# Patient Record
Sex: FEMALE | Race: BLACK OR AFRICAN AMERICAN | NOT HISPANIC OR LATINO | Employment: FULL TIME | URBAN - METROPOLITAN AREA
[De-identification: names, ages, dates, MRNs, and addresses within clinical notes are randomized per-mention and may not be internally consistent; named-entity substitution may affect disease eponyms.]

---

## 2018-02-01 ENCOUNTER — OFFICE VISIT (OUTPATIENT)
Dept: OTOLARYNGOLOGY | Facility: CLINIC | Age: 53
End: 2018-02-01
Payer: COMMERCIAL

## 2018-02-01 ENCOUNTER — CLINICAL SUPPORT (OUTPATIENT)
Dept: AUDIOLOGY | Facility: CLINIC | Age: 53
End: 2018-02-01
Payer: COMMERCIAL

## 2018-02-01 VITALS
HEART RATE: 70 BPM | HEIGHT: 60 IN | SYSTOLIC BLOOD PRESSURE: 134 MMHG | DIASTOLIC BLOOD PRESSURE: 89 MMHG | BODY MASS INDEX: 40.98 KG/M2 | WEIGHT: 208.75 LBS

## 2018-02-01 DIAGNOSIS — H61.21 RIGHT EAR IMPACTED CERUMEN: Primary | ICD-10-CM

## 2018-02-01 DIAGNOSIS — H90.3 BILATERAL HIGH FREQUENCY SENSORINEURAL HEARING LOSS: ICD-10-CM

## 2018-02-01 DIAGNOSIS — H90.3 BILATERAL HIGH FREQUENCY SENSORINEURAL HEARING LOSS: Primary | ICD-10-CM

## 2018-02-01 DIAGNOSIS — H91.90 HEARING DIFFICULTY, UNSPECIFIED LATERALITY: Primary | ICD-10-CM

## 2018-02-01 PROCEDURE — 99999 PR PBB SHADOW E&M-EST. PATIENT-LVL I: CPT | Mod: PBBFAC,,,

## 2018-02-01 PROCEDURE — 92567 TYMPANOMETRY: CPT | Mod: S$GLB,,, | Performed by: AUDIOLOGIST-HEARING AID FITTER

## 2018-02-01 PROCEDURE — 92557 COMPREHENSIVE HEARING TEST: CPT | Mod: S$GLB,,, | Performed by: AUDIOLOGIST-HEARING AID FITTER

## 2018-02-01 PROCEDURE — 3008F BODY MASS INDEX DOCD: CPT | Mod: S$GLB,,, | Performed by: NURSE PRACTITIONER

## 2018-02-01 PROCEDURE — 69210 REMOVE IMPACTED EAR WAX UNI: CPT | Mod: RT,S$GLB,, | Performed by: NURSE PRACTITIONER

## 2018-02-01 PROCEDURE — 99203 OFFICE O/P NEW LOW 30 MIN: CPT | Mod: 25,S$GLB,, | Performed by: NURSE PRACTITIONER

## 2018-02-01 PROCEDURE — 99999 PR PBB SHADOW E&M-EST. PATIENT-LVL III: CPT | Mod: PBBFAC,,, | Performed by: NURSE PRACTITIONER

## 2018-02-01 NOTE — LETTER
February 1, 2018      Lazaro Jensen MD  [address]  [place] [address]           [place] [facility]  [address] [facility] [address] [place] [address]  Phone: [phone]  Fax: [phone]          Patient: Norma Call   MR Number: [id number]   YOB: 1965   Date of Visit: 2/1/2018       Dear Dr. Lazaro Jensen:    Thank you for referring Norma Call to me for evaluation. Attached you will find relevant portions of my assessment and plan of care.    If you have questions, please do not hesitate to call me. I look forward to following Norma Call along with you.    Sincerely,    Chanell Bahena, NP    Enclosure  CC:  No Recipients    If you would like to receive this communication electronically, please contact [email or web address] or [phone] to request more information on [facility] access.    For providers and/or their staff who would like to refer a patient to [facility], please contact us through our one-stop-shop provider referral line, [facility] , at [phone].    If you feel you have received this communication in error or would no longer like to receive these types of communications, please e-mail [email or web address]

## 2018-02-01 NOTE — PROGRESS NOTES
Subjective:       Patient ID: Norma Call is a 52 y.o. female.    Chief Complaint: Cerumen Impaction and Ear Fullness    HPI   Patient reports gradual onset difficulty hearing and ear fullness. No otalgia or otorrhea. No other ENT symptoms or concerns. She states she take Singulair daily but cannot do nasal sprays daily because they cause drowsiness and she works nights.     Review of Systems   Constitutional: Negative.    HENT: Negative.         Ear fullness, muffled hearing   Eyes: Negative.    Respiratory: Negative.    Cardiovascular: Negative.    Gastrointestinal: Negative.    Musculoskeletal: Negative.    Skin: Negative.    Neurological: Negative.    Hematological: Negative.    Psychiatric/Behavioral: Negative.        Objective:      Physical Exam   Constitutional: She is oriented to person, place, and time. Vital signs are normal. She appears well-developed and well-nourished. She is cooperative. She does not appear ill. No distress.   HENT:   Head: Normocephalic and atraumatic.   Right Ear: Hearing, tympanic membrane, external ear and ear canal normal. Tympanic membrane is not erythematous. No middle ear effusion.   Left Ear: Hearing, tympanic membrane, external ear and ear canal normal. Tympanic membrane is not erythematous.  No middle ear effusion.   Nose: Nose normal. No mucosal edema or rhinorrhea. Right sinus exhibits no maxillary sinus tenderness and no frontal sinus tenderness. Left sinus exhibits no maxillary sinus tenderness and no frontal sinus tenderness.   Mouth/Throat: Uvula is midline, oropharynx is clear and moist and mucous membranes are normal. Mucous membranes are not pale, not dry and not cyanotic. No oral lesions. No oropharyngeal exudate, posterior oropharyngeal edema or posterior oropharyngeal erythema.     SEPARATE PROCEDURE IN OFFICE:   Procedure: Removal of impacted cerumen, RIGHT   Pre Procedure Diagnosis: Cerumen Impaction   Post Procedure Diagnosis: Cerumen Impaction   Verbal  informed consent in regards to risk of trauma to ear canal, ear drum or hearing, discomfort during procedure and/or inability to remove cerumen impaction in one session or unforeseen events or complications.   No anesthesia.     Procedure in detail:   Ear canal visualized bilateral with appropriate size ear speculum utilizing Operating Head Binocular Otomicroscope   Utilizing the following: Ring curet, delicate alligator forceps, and/or suction cannula. The impacted cerumen of the ear canals was removed atraumatically. The TM and EAC were then inspected and found to be clear of wax. See description of TMs/EACs in PE above.   Complications: No   Condition: Improved/Good     Eyes: Conjunctivae, EOM and lids are normal. Pupils are equal, round, and reactive to light. Right eye exhibits no discharge. Left eye exhibits no discharge. No scleral icterus.   Neck: Trachea normal and normal range of motion. Neck supple. No tracheal deviation present. No thyroid mass and no thyromegaly present.   Cardiovascular: Normal rate.    Pulmonary/Chest: Effort normal. No stridor. No respiratory distress. She has no wheezes.   Musculoskeletal: Normal range of motion.   Lymphadenopathy:        Head (right side): No submental, no submandibular, no tonsillar, no preauricular and no posterior auricular adenopathy present.        Head (left side): No submental, no submandibular, no tonsillar, no preauricular and no posterior auricular adenopathy present.     She has no cervical adenopathy.        Right cervical: No superficial cervical and no posterior cervical adenopathy present.       Left cervical: No superficial cervical and no posterior cervical adenopathy present.   Neurological: She is alert and oriented to person, place, and time. She has normal strength. Coordination and gait normal.   Skin: Skin is warm, dry and intact. No lesion and no rash noted. She is not diaphoretic. No cyanosis. No pallor.   Psychiatric: She has a normal mood  and affect. Her speech is normal and behavior is normal. Judgment and thought content normal. Cognition and memory are normal.   Nursing note and vitals reviewed.      Assessment:     Right cerumen impaction removed    Mild high-frequency SNHL AU  Plan:     Return as needed for further ENT concerns

## 2018-02-01 NOTE — PROGRESS NOTES
Norma Call was seen 02/01/2018 for an audiological evaluation. Patient complains of clogged ears. Pt reports some Hx of noise exposure but denies family Hx of hearing loss and tinnitus. She doesn't notice any significant difficulties hearing conversation at this time.     Results reveal a bilateral normal sloping to mild rising back to normal sensorineural hearing loss.    Speech Reception Thresholds were  10 dBHL for the right ear and 5 dBHL for the left ear.    Word recognition scores were excellent for the right ear and excellent for the left ear.   Tympanograms were Type Ad for the right ear and Type Ad for the left ear.    Audiogram results were reviewed in detail with patient and all questions were answered. Results will be reviewed by ENT at the completion of this note. Recommend trial of binaural amplification pending medical clearance, annual hearing tests to monitor hearing and hearing protection in loud noise.

## 2019-08-15 ENCOUNTER — OCCUPATIONAL HEALTH (OUTPATIENT)
Dept: URGENT CARE | Facility: CLINIC | Age: 54
End: 2019-08-15

## 2019-08-15 PROCEDURE — 86580 PR  TB INTRADERMAL TEST: ICD-10-PCS | Mod: S$GLB,,, | Performed by: EMERGENCY MEDICINE

## 2019-08-15 PROCEDURE — 86580 TB INTRADERMAL TEST: CPT | Mod: S$GLB,,, | Performed by: EMERGENCY MEDICINE

## 2021-03-16 ENCOUNTER — IMMUNIZATION (OUTPATIENT)
Dept: FAMILY MEDICINE | Facility: CLINIC | Age: 56
End: 2021-03-16

## 2021-03-16 DIAGNOSIS — Z23 NEED FOR VACCINATION: Primary | ICD-10-CM

## 2021-03-16 PROCEDURE — 91300 COVID-19, MRNA, LNP-S, PF, 30 MCG/0.3 ML DOSE VACCINE: CPT | Mod: ,,, | Performed by: FAMILY MEDICINE

## 2021-03-16 PROCEDURE — 91300 COVID-19, MRNA, LNP-S, PF, 30 MCG/0.3 ML DOSE VACCINE: ICD-10-PCS | Mod: ,,, | Performed by: FAMILY MEDICINE

## 2021-03-16 PROCEDURE — 0001A COVID-19, MRNA, LNP-S, PF, 30 MCG/0.3 ML DOSE VACCINE: CPT | Mod: CV19,,, | Performed by: FAMILY MEDICINE

## 2021-03-16 PROCEDURE — 0001A COVID-19, MRNA, LNP-S, PF, 30 MCG/0.3 ML DOSE VACCINE: ICD-10-PCS | Mod: CV19,,, | Performed by: FAMILY MEDICINE

## 2021-04-06 ENCOUNTER — IMMUNIZATION (OUTPATIENT)
Dept: FAMILY MEDICINE | Facility: CLINIC | Age: 56
End: 2021-04-06

## 2021-04-06 DIAGNOSIS — Z23 NEED FOR VACCINATION: Primary | ICD-10-CM

## 2021-04-06 PROCEDURE — 91300 COVID-19, MRNA, LNP-S, PF, 30 MCG/0.3 ML DOSE VACCINE: CPT | Mod: ,,, | Performed by: FAMILY MEDICINE

## 2021-04-06 PROCEDURE — 0002A COVID-19, MRNA, LNP-S, PF, 30 MCG/0.3 ML DOSE VACCINE: CPT | Mod: CV19,,, | Performed by: FAMILY MEDICINE

## 2021-04-06 PROCEDURE — 91300 COVID-19, MRNA, LNP-S, PF, 30 MCG/0.3 ML DOSE VACCINE: ICD-10-PCS | Mod: ,,, | Performed by: FAMILY MEDICINE

## 2021-04-06 PROCEDURE — 0002A COVID-19, MRNA, LNP-S, PF, 30 MCG/0.3 ML DOSE VACCINE: ICD-10-PCS | Mod: CV19,,, | Performed by: FAMILY MEDICINE

## 2021-09-14 PROBLEM — I48.91 ATRIAL FIBRILLATION: Status: ACTIVE | Noted: 2021-09-14

## 2021-10-21 ENCOUNTER — IMMUNIZATION (OUTPATIENT)
Dept: FAMILY MEDICINE | Facility: CLINIC | Age: 56
End: 2021-10-21
Payer: OTHER GOVERNMENT

## 2021-10-21 DIAGNOSIS — Z23 NEED FOR VACCINATION: Primary | ICD-10-CM

## 2021-10-21 PROCEDURE — 0003A COVID-19, MRNA, LNP-S, PF, 30 MCG/0.3 ML DOSE VACCINE: CPT | Mod: PBBFAC,PO

## 2021-10-21 PROCEDURE — 91300 COVID-19, MRNA, LNP-S, PF, 30 MCG/0.3 ML DOSE VACCINE: CPT | Mod: PBBFAC,PO

## 2022-01-10 ENCOUNTER — OFFICE VISIT (OUTPATIENT)
Dept: SPINE | Facility: CLINIC | Age: 57
End: 2022-01-10
Payer: OTHER GOVERNMENT

## 2022-01-10 ENCOUNTER — HOSPITAL ENCOUNTER (OUTPATIENT)
Dept: RADIOLOGY | Facility: HOSPITAL | Age: 57
Discharge: HOME | End: 2022-01-10
Attending: PHYSICIAN ASSISTANT
Payer: OTHER GOVERNMENT

## 2022-01-10 VITALS
HEART RATE: 63 BPM | SYSTOLIC BLOOD PRESSURE: 122 MMHG | WEIGHT: 224 LBS | HEIGHT: 60 IN | BODY MASS INDEX: 43.98 KG/M2 | DIASTOLIC BLOOD PRESSURE: 85 MMHG

## 2022-01-10 DIAGNOSIS — M62.838 MUSCLE SPASM OF SHOULDER REGION: ICD-10-CM

## 2022-01-10 DIAGNOSIS — M79.2 CERVICAL SPINE ARTHRITIS WITH NERVE PAIN: ICD-10-CM

## 2022-01-10 DIAGNOSIS — M79.602 LEFT ARM PAIN: ICD-10-CM

## 2022-01-10 DIAGNOSIS — M47.812 CERVICAL SPINE ARTHRITIS WITH NERVE PAIN: ICD-10-CM

## 2022-01-10 PROCEDURE — 99999 PR PBB SHADOW E&M-EST. PATIENT-LVL V: CPT | Mod: PBBFAC,,, | Performed by: PHYSICIAN ASSISTANT

## 2022-01-10 PROCEDURE — 99243 PR OFFICE CONSULTATION,LEVEL III: ICD-10-PCS | Mod: S$PBB,,, | Performed by: PHYSICIAN ASSISTANT

## 2022-01-10 PROCEDURE — 99999 PR PBB SHADOW E&M-EST. PATIENT-LVL V: ICD-10-PCS | Mod: PBBFAC,,, | Performed by: PHYSICIAN ASSISTANT

## 2022-01-10 PROCEDURE — 99243 OFF/OP CNSLTJ NEW/EST LOW 30: CPT | Mod: S$PBB,,, | Performed by: PHYSICIAN ASSISTANT

## 2022-01-10 PROCEDURE — 72052 X-RAY EXAM NECK SPINE 6/>VWS: CPT | Mod: 26,,, | Performed by: RADIOLOGY

## 2022-01-10 PROCEDURE — 72052 X-RAY EXAM NECK SPINE 6/>VWS: CPT | Mod: TC,PO

## 2022-01-10 PROCEDURE — 72052 XR CERVICAL SPINE 5 VIEW WITH FLEX AND EXT: ICD-10-PCS | Mod: 26,,, | Performed by: RADIOLOGY

## 2022-01-10 PROCEDURE — 99215 OFFICE O/P EST HI 40 MIN: CPT | Mod: PBBFAC,PN | Performed by: PHYSICIAN ASSISTANT

## 2022-01-10 RX ORDER — METHYLPREDNISOLONE 4 MG/1
TABLET ORAL
Qty: 1 EACH | Refills: 0 | Status: SHIPPED | OUTPATIENT
Start: 2022-01-10 | End: 2022-01-31

## 2022-01-10 NOTE — PROGRESS NOTES
Back and Spine Consult    Patient ID: Norma Call is a 56 y.o. female.    Chief Complaint   Patient presents with    Shoulder Pain     She has had left shoulder pain that radiates down the left arm to the elbow for 5-6 months. Pain started and gradually got worse until she noticed pain lifting the left arm.       Review of Systems   Constitutional: Negative for activity change, appetite change, chills, fever and unexpected weight change.   HENT: Negative for tinnitus, trouble swallowing and voice change.    Respiratory: Negative for apnea, cough, chest tightness and shortness of breath.    Cardiovascular: Negative for chest pain and palpitations.   Gastrointestinal: Negative for constipation, diarrhea, nausea and vomiting.   Genitourinary: Negative for difficulty urinating, dysuria, frequency and urgency.   Musculoskeletal: Positive for arthralgias, myalgias and neck pain. Negative for back pain, gait problem and neck stiffness.   Skin: Negative for wound.   Neurological: Positive for numbness. Negative for dizziness, tremors, seizures, facial asymmetry, speech difficulty, weakness, light-headedness and headaches.   Psychiatric/Behavioral: Negative for confusion and decreased concentration.       Past Medical History:   Diagnosis Date    a Family H/O CAD ###    On ASA 81 Mg Daily; 7/2/21 Cardiac CT Ca+ Score = 0 (See Report); 3/20/13 LCST (Dr. CLARA Treviño) = Entirely Normal; 3/20/13 Echo (Dr. CLARA Treviño) = Normal With EF 60-65%, With Normal Biventricular Size And Function, And With No Diastolic Dysfuncion     b Chronic Hypokalemia     3/30/17 Increased Her 10 mEq KCl Daily To Bid; Is Likely Due To Her Losartan/HCTZ 100/12.5 Mg qAM    b Hypertension     12/6/19 Added Norvasc 5 Mg qHS; 3/30/17 Increased Losartan/HCTZ 100/12.5 To 100/25 Mg qAM; 12/6/19 RXd A Low Salt Diet    c Hypercholesteremia     6/15/20 RXd Crestor 10 Mg qHS And D/Cd Pravastatin (Was Ineffective); 1/19/20 Increased 10 Mg Pravastatin To 40 Mg qHS  "   d Type 2 Diabetes Mellitus     12/21/21 Increased Amaryl To 2 Mg qAM; 7/4/20 Added Amaryl 1 Mg qAM; On Metformin- Mg Daily    e Family H/O Thyroid Disorder     f Borderline Osteopenia     5/5/18 Continued Her OTC D3 50K IU Weekly, And RXd OTC CaCO3 1,200 Mg Daily    f Obesity     10/2/17 RXd Lifestyle Changes; 5/10/16 BMI = 39.26    g Chronic Mild Thrombocytosis     Am Monitoring    i H/O COVID-19 Infection     In 01/2021    i Obstructive Sleep Apnea ###    Dr. Caroline Woodruff On 8/1/13 Scheduled A CPAP Titration Study    j Family H/O Colon Cancer ###    Dr. William Ruiz    j H/O Hyperplastic Colon Polyp On 4/30/15 TC     Dr. William Ruiz: "Repeat TC In 5 YRs"    j Sigmoid Diverticulosis     Dr. William Ruiz    k H/O Abnormal PAP Smear     Dr. Phillip Jj; Repeats Have Been Normal    k Hemorrhagic Right Renal Cyst     Dr. Verna Alicea On 11/15/16 Does NOT Suspect Renal Cell CA; 03/2016 Renal U/S And 05/2016 MRI Were Benign    l H/O Left Ankle And Foot Fracture Repair SX 09/2015     Dr. Page In [place]; She Has Been Having Heel Pain Since He Removed The Screws    m Headaches     Takes Ibuprofen 800 Mg PRN And Tramadol 50 Mg qHS PRN    o Allergic Rhinosinusitis     q Acne Vulgaris     q Vitamin D Deficiency     On Vitamin D3 50K Weekly     Wellness Visit 6/17/2021      Social History     Socioeconomic History    Marital status:      Spouse name: Ulysses    Number of children: 3   Occupational History    Occupation: nurse   Tobacco Use    Smoking status: Never Smoker    Smokeless tobacco: Never Used   Substance and Sexual Activity    Alcohol use: No    Drug use: No    Sexual activity: Not Currently     Family History   Problem Relation Age of Onset    Diabetes Maternal Grandmother     Stroke Father     Colon cancer Brother     Diabetes Brother     Hypertension Brother     Cancer Brother     Heart failure Mother     Liver disease Daughter  "     Review of patient's allergies indicates:   Allergen Reactions    Percocet [oxycodone-acetaminophen] Nausea And Vomiting    Codeine Palpitations       Current Outpatient Medications:     amLODIPine (NORVASC) 2.5 MG tablet, Take 1 tablet (2.5 mg total) by mouth once daily., Disp: 90 tablet, Rfl: 0    amLODIPine (NORVASC) 5 MG tablet, Take 1 tablet (5 mg total) by mouth once daily., Disp: 90 tablet, Rfl: 0    aspirin (ECOTRIN) 81 MG EC tablet, Take 1 tablet (81 mg total) by mouth once daily., Disp: , Rfl: 0    blood-glucose meter (BLOOD GLUCOSE MONITORING) kit, Use as instructed, Disp: 1 each, Rfl: 0    ergocalciferol (ERGOCALCIFEROL) 50,000 unit Cap, Take 1 capsule (50,000 Units total) by mouth every 7 days., Disp: 12 capsule, Rfl: 1    fluticasone propionate (FLONASE) 50 mcg/actuation nasal spray, 1 spray (50 mcg total) by Each Nostril route once daily. (Patient taking differently: 1 spray by Each Nostril route as needed.), Disp: 18.2 mL, Rfl: 3    glimepiride (AMARYL) 2 MG tablet, Take 1 tablet (2 mg total) by mouth every morning., Disp: 90 tablet, Rfl: 3    ibuprofen (ADVIL,MOTRIN) 800 MG tablet, Take 800 mg by mouth as needed., Disp: , Rfl: 0    lancets Misc, 1 lancet by Misc.(Non-Drug; Combo Route) route once daily., Disp: 100 each, Rfl: 6    magnesium 250 mg Tab, Take by mouth once., Disp: , Rfl:     metFORMIN (GLUCOPHAGE) 1000 MG tablet, Take 1 tablet (1,000 mg total) by mouth 2 (two) times daily., Disp: 180 tablet, Rfl: 3    montelukast (SINGULAIR) 10 mg tablet, TAKE 1 TABLET(10 MG) BY MOUTH EVERY NIGHT AS NEEDED (Patient taking differently: Take 10 mg by mouth every evening.), Disp: 90 tablet, Rfl: 1    mupirocin (BACTROBAN) 2 % ointment, Apply topically once daily. Apply to affected area, Disp: , Rfl: 0    PATADAY 0.2 % Drop, 1 drop daily as needed. , Disp: , Rfl: 3    potassium chloride SA (K-DUR,KLOR-CON) 10 MEQ tablet, Take 1 tablet (10 mEq total) by mouth 2 (two) times daily., Disp:  90 tablet, Rfl: 1    rosuvastatin (CRESTOR) 10 MG tablet, Take 1 tablet (10 mg total) by mouth every evening., Disp: 90 tablet, Rfl: 3    telmisartan-hydrochlorothiazide (MICARDIS HCT) 80-25 mg per tablet, Take 1 tablet by mouth every morning., Disp: 90 tablet, Rfl: 3    TRUE METRIX GLUCOSE TEST STRIP Strp, USE 1 STRIP TO TEST BLOOD SUGAR ONCE DAILY, Disp: 100 strip, Rfl: 0    amitriptyline (ELAVIL) 10 MG tablet, Take 1 tablet (10 mg total) by mouth nightly as needed for Insomnia or Pain. (Patient not taking: Reported on 1/10/2022), Disp: 90 tablet, Rfl: 1    azelastine (ASTELIN) 137 mcg (0.1 %) nasal spray, 1 spray (137 mcg total) by Nasal route every evening. (Patient taking differently: 1 spray by Nasal route as needed.), Disp: 90 mL, Rfl: 3    levocetirizine (XYZAL) 5 MG tablet, Take 1 tablet (5 mg total) by mouth every evening., Disp: 30 tablet, Rfl: 11    methylPREDNISolone (MEDROL, MARILEE,) 4 mg tablet, use as directed, Disp: 1 each, Rfl: 0    Vitals:    01/10/22 1036   BP: 122/85   BP Location: Right arm   Patient Position: Sitting   BP Method: Large (Automatic)   Pulse: 63   Weight: 101.6 kg (223 lb 15.8 oz)   Height: 5' (1.524 m)       Physical Exam  Vitals and nursing note reviewed.   Constitutional:       Appearance: She is well-developed and well-nourished.   HENT:      Head: Normocephalic and atraumatic.   Eyes:      Pupils: Pupils are equal, round, and reactive to light.   Cardiovascular:      Rate and Rhythm: Normal rate.   Pulmonary:      Effort: Pulmonary effort is normal.   Musculoskeletal:         General: No edema. Normal range of motion.      Cervical back: Normal range of motion and neck supple.   Skin:     General: Skin is warm, dry and intact.   Neurological:      Mental Status: She is alert and oriented to person, place, and time.      Coordination: Finger-Nose-Finger Test, Heel to Shin Test and Romberg Test normal.      Gait: Gait is intact. Tandem walk normal.      Deep Tendon  Reflexes:      Reflex Scores:       Tricep reflexes are 2+ on the right side and 2+ on the left side.       Bicep reflexes are 2+ on the right side and 2+ on the left side.       Brachioradialis reflexes are 2+ on the right side and 2+ on the left side.       Patellar reflexes are 2+ on the right side and 2+ on the left side.       Achilles reflexes are 2+ on the right side and 2+ on the left side.  Psychiatric:         Mood and Affect: Mood and affect normal.         Speech: Speech normal.         Behavior: Behavior normal.         Thought Content: Thought content normal.         Judgment: Judgment normal.         Neurologic Exam     Mental Status   Oriented to person, place, and time.   Oriented to person.   Oriented to place.   Oriented to time.   Follows 3 step commands.   Attention: normal. Concentration: normal.   Speech: speech is normal   Level of consciousness: alert  Knowledge: consistent with education.   Able to name object. Able to read. Able to repeat. Able to write. Normal comprehension.     Cranial Nerves     CN II   Visual acuity: normal  Right visual field deficit: none  Left visual field deficit: none     CN III, IV, VI   Pupils are equal, round, and reactive to light.  Right pupil: Size: 3 mm. Shape: regular. Reactivity: brisk. Consensual response: intact.   Left pupil: Size: 3 mm. Shape: regular. Reactivity: brisk. Consensual response: intact.   CN III: no CN III palsy  CN VI: no CN VI palsy  Nystagmus: none   Diplopia: none  Ophthalmoparesis: none  Conjugate gaze: present    CN V   Right facial sensation deficit: none  Left facial sensation deficit: none    CN VII   Right facial weakness: none  Left facial weakness: none    CN VIII   Hearing: intact    CN IX, X   CN IX normal.   CN X normal.     CN XI   Right sternocleidomastoid strength: normal  Left sternocleidomastoid strength: normal  Right trapezius strength: normal  Left trapezius strength: normal    CN XII   Fasciculations:  absent  Tongue deviation: none    Motor Exam   Muscle bulk: normal  Overall muscle tone: normal  Right arm pronator drift: absent  Left arm pronator drift: absent    Strength   Right neck flexion: 5/5  Left neck flexion: 5/5  Right neck extension: 5/5  Left neck extension: /5  Right deltoid: 5/5  Left deltoid: 5/5  Right biceps: 5/5  Left biceps: 5/  Right triceps: 5/  Left triceps:   Right wrist flexion: /  Left wrist flexion: /5  Right wrist extension: /  Left wrist extension: /  Right interossei: /5  Left interossei: /  Right abdominals: 5/  Left abdominals:   Right iliopsoas:   Left iliopsoas:   Right quadriceps:   Left quadriceps:   Right hamstrin/5  Left hamstrin/5  Right glutei:   Left glutei:   Right anterior tibial:   Left anterior tibial:   Right posterior tibial:   Left posterior tibial:   Right peroneal:   Left peroneal:   Right gastroc:   Left gastroc:     Sensory Exam   Right arm light touch: normal  Left arm light touch: normal  Right leg light touch: normal  Left leg light touch: normal  Right arm vibration: normal  Left arm vibration: normal  Right arm pinprick: normal  Left arm pinprick: normal    Gait, Coordination, and Reflexes     Gait  Gait: normal    Coordination   Romberg: negative  Finger to nose coordination: normal  Heel to shin coordination: normal  Tandem walking coordination: normal    Tremor   Resting tremor: absent  Intention tremor: absent  Action tremor: absent    Reflexes   Right brachioradialis: 2+  Left brachioradialis: 2+  Right biceps: 2+  Left biceps: 2+  Right triceps: 2+  Left triceps: 2+  Right patellar: 2+  Left patellar: 2+  Right achilles: 2+  Left achilles: 2+  Right Eaton: absent  Left Eaton: absent  Right ankle clonus: absent  Left ankle clonus: absent      Provider dictation:    56 year old female with headaches, hypertension, atrial fibrillation (loop recorder), obesity, diabetes is referred  by Yoly Turcios for evaluation of left shoulder/ arm pain.  About 1 year ago she felt cramping in the neck it gradually changed to pain in the left shoulder and interscapular region.  Pain really became more noticeable 6 months ago.  She has pain I the left interscapular region with radation to the left biceps area and some below the elbow.  It is constant low level pain, but most noticeable and increased with lifting the left arm.  She has had a few incidences of numbness/ tingling in the left arm.  Pain increases with rotation of the left shoulder.    Current medications:  Advil, tylenol  Medications tried:  Physical therapy:  Interventional Procedures:     On exam, there is 5/5 strength with 2+ DTR and no sensory deficits.  Gait and station fluid.  Denies bowel/ bladder dysfunction.  Full range of motion of the upper and lower extremities. No pain with axial facet loading.  No SI joint pain on palpation.  No pain with lumbar flexion/ extension.    Xray cervical spine from 3/29/18 reviewed.  There is mild spondylosis.  Minimal disk space narrowing at C6/7.    Ms. Green has acute onset pain in the left scapular/ shoulder arm.  Possible cervical radiculopathy without neurological deficits.   Also cannot rule out rotator cuff issues.   I recommend she try PT for the neck and shoulder.  We will obtain cervical spine xrays to determine if any progressession of degenerative chagnes in the neck from 2018.  Will call with results.  We will try medrol taper to hlep with inflammation and pain; we discussed her blood sugar and to watch closely as the steroid can make it rise short term.  Follow up in 6 weeks to monitor progress.      Visit Diagnosis:  Left arm pain  -     Ambulatory referral/consult to [facility]  -     methylPREDNISolone (MEDROL, MARILEE,) 4 mg tablet; use as directed  Dispense: 1 each; Refill: 0  -     Ambulatory referral/consult to Physical/Occupational Therapy; Future; Expected date:  01/17/2022  -     X-Ray Cervical Spine 5 View W Flex Extxt; Future; Expected date: 01/10/2022    Muscle spasm of shoulder region  -     Ambulatory referral/consult to [facility]  -     methylPREDNISolone (MEDROL, MARILEE,) 4 mg tablet; use as directed  Dispense: 1 each; Refill: 0  -     Ambulatory referral/consult to Physical/Occupational Therapy; Future; Expected date: 01/17/2022  -     X-Ray Cervical Spine 5 View W Flex Extxt; Future; Expected date: 01/10/2022    Cervical spine arthritis with nerve pain  -     Ambulatory referral/consult to [facility]  -     methylPREDNISolone (MEDROL, MARILEE,) 4 mg tablet; use as directed  Dispense: 1 each; Refill: 0  -     Ambulatory referral/consult to Physical/Occupational Therapy; Future; Expected date: 01/17/2022  -     X-Ray Cervical Spine 5 View W Flex Extxt; Future; Expected date: 01/10/2022        Total time spent counseling greater than fifty percent of total visit time.  Counseling included discussion regarding imaging findings, diagnosis possibilities, treatment options, risks and benefits.   The patient had many questions regarding the options and long-term effects.

## 2022-02-21 ENCOUNTER — OFFICE VISIT (OUTPATIENT)
Dept: SPINE | Facility: CLINIC | Age: 57
End: 2022-02-21
Payer: OTHER GOVERNMENT

## 2022-02-21 VITALS
BODY MASS INDEX: 44.46 KG/M2 | DIASTOLIC BLOOD PRESSURE: 83 MMHG | HEART RATE: 92 BPM | SYSTOLIC BLOOD PRESSURE: 120 MMHG | WEIGHT: 226.44 LBS | HEIGHT: 60 IN

## 2022-02-21 DIAGNOSIS — M62.838 MUSCLE SPASM OF SHOULDER REGION: ICD-10-CM

## 2022-02-21 DIAGNOSIS — M79.602 LEFT ARM PAIN: Primary | ICD-10-CM

## 2022-02-21 DIAGNOSIS — M47.812 CERVICAL SPONDYLOSIS: ICD-10-CM

## 2022-02-21 PROCEDURE — 99215 OFFICE O/P EST HI 40 MIN: CPT | Mod: PBBFAC,PN | Performed by: PHYSICIAN ASSISTANT

## 2022-02-21 PROCEDURE — 99999 PR PBB SHADOW E&M-EST. PATIENT-LVL V: CPT | Mod: PBBFAC,,, | Performed by: PHYSICIAN ASSISTANT

## 2022-02-21 PROCEDURE — 99213 OFFICE O/P EST LOW 20 MIN: CPT | Mod: S$PBB,,, | Performed by: PHYSICIAN ASSISTANT

## 2022-02-21 PROCEDURE — 99999 PR PBB SHADOW E&M-EST. PATIENT-LVL V: ICD-10-PCS | Mod: PBBFAC,,, | Performed by: PHYSICIAN ASSISTANT

## 2022-02-21 PROCEDURE — 99213 PR OFFICE/OUTPT VISIT, EST, LEVL III, 20-29 MIN: ICD-10-PCS | Mod: S$PBB,,, | Performed by: PHYSICIAN ASSISTANT

## 2022-02-21 NOTE — PROGRESS NOTES
Back and Spine Consult    Patient ID: Norma Call is a 56 y.o. female.    Chief Complaint   Patient presents with    Follow-up     Left shoulder pain       Review of Systems   Constitutional: Negative for activity change, appetite change, chills, fever and unexpected weight change.   HENT: Negative for tinnitus, trouble swallowing and voice change.    Respiratory: Negative for apnea, cough, chest tightness and shortness of breath.    Cardiovascular: Negative for chest pain and palpitations.   Gastrointestinal: Negative for constipation, diarrhea, nausea and vomiting.   Genitourinary: Negative for difficulty urinating, dysuria, frequency and urgency.   Musculoskeletal: Positive for arthralgias, myalgias and neck pain. Negative for back pain, gait problem and neck stiffness.   Skin: Negative for wound.   Neurological: Positive for numbness. Negative for dizziness, tremors, seizures, facial asymmetry, speech difficulty, weakness, light-headedness and headaches.   Psychiatric/Behavioral: Negative for confusion and decreased concentration.       Past Medical History:   Diagnosis Date    a Family H/O CAD ###    On ASA 81 Mg Daily; 7/2/21 Cardiac CT Ca+ Score = 0 (See Report); 3/20/13 LCST (Dr. CLARA Treviño) = Entirely Normal; 3/20/13 Echo (Dr. CLARA Treviño) = Normal With EF 60-65%, With Normal Biventricular Size And Function, And With No Diastolic Dysfuncion     b Chronic Hypokalemia     3/30/17 Increased Her 10 mEq KCl Daily To Bid; Is Likely Due To Her Losartan/HCTZ 100/12.5 Mg qAM    b Hypertension     12/6/19 Added Norvasc 5 Mg qHS; 3/30/17 Increased Losartan/HCTZ 100/12.5 To 100/25 Mg qAM; 12/6/19 RXd A Low Salt Diet    c Hypercholesteremia     6/15/20 RXd Crestor 10 Mg qHS And D/Cd Pravastatin (Was Ineffective); 1/19/20 Increased 10 Mg Pravastatin To 40 Mg qHS    d Type 2 Diabetes Mellitus     12/21/21 Increased Amaryl To 2 Mg qAM; 7/4/20 Added Amaryl 1 Mg qAM; On Metformin- Mg Daily    e Family H/O Thyroid  "Disorder     f Borderline Osteopenia     5/5/18 Continued Her OTC D3 50K IU Weekly, And RXd OTC CaCO3 1,200 Mg Daily    f Obesity     10/2/17 RXd Lifestyle Changes; 5/10/16 BMI = 39.26    g Chronic Mild Thrombocytosis     Am Monitoring    i H/O COVID-19 Infection     In 01/2021    i Obstructive Sleep Apnea ###    Dr. Caroline Woodruff On 8/1/13 Scheduled A CPAP Titration Study    j Family H/O Colon Cancer ###    Dr. William Ruiz    j H/O Hyperplastic Colon Polyp On 4/30/15 TC     Dr. William Ruiz: "Repeat TC In 5 YRs"    j Sigmoid Diverticulosis     Dr. William Ruiz    k H/O Abnormal PAP Smear     Dr. Phillip Jj; Repeats Have Been Normal    k Hemorrhagic Right Renal Cyst     Dr. Verna Alicea On 11/15/16 Does NOT Suspect Renal Cell CA; 03/2016 Renal U/S And 05/2016 MRI Were Benign    l H/O Left Ankle And Foot Fracture Repair SX 09/2015     Dr. Page In [place]; She Has Been Having Heel Pain Since He Removed The Screws    m Headaches     Takes Ibuprofen 800 Mg PRN And Tramadol 50 Mg qHS PRN    o Allergic Rhinosinusitis     q Acne Vulgaris     q Vitamin D Deficiency     On Vitamin D3 50K Weekly     Wellness Visit 6/17/2021      Social History     Socioeconomic History    Marital status:      Spouse name: Ulysses    Number of children: 3   Occupational History    Occupation: nurse   Tobacco Use    Smoking status: Never Smoker    Smokeless tobacco: Never Used   Substance and Sexual Activity    Alcohol use: No    Drug use: No    Sexual activity: Not Currently     Family History   Problem Relation Age of Onset    Diabetes Maternal Grandmother     Stroke Father     Colon cancer Brother     Diabetes Brother     Hypertension Brother     Cancer Brother     Heart failure Mother     Liver disease Daughter      Review of patient's allergies indicates:   Allergen Reactions    Percocet [oxycodone-acetaminophen] Nausea And Vomiting    Codeine Palpitations       Current " Outpatient Medications:     amitriptyline (ELAVIL) 10 MG tablet, Take 1 tablet (10 mg total) by mouth nightly as needed for Insomnia or Pain., Disp: 90 tablet, Rfl: 3    amLODIPine (NORVASC) 2.5 MG tablet, Take 1 tablet (2.5 mg total) by mouth once daily., Disp: 90 tablet, Rfl: 0    amLODIPine (NORVASC) 5 MG tablet, Take 1 tablet (5 mg total) by mouth once daily., Disp: 90 tablet, Rfl: 0    aspirin (ECOTRIN) 81 MG EC tablet, Take 1 tablet (81 mg total) by mouth once daily., Disp: , Rfl: 0    azelastine (ASTELIN) 137 mcg (0.1 %) nasal spray, 1 spray (137 mcg total) by Nasal route as needed for Rhinitis., Disp: 30 mL, Rfl: 3    blood-glucose meter (BLOOD GLUCOSE MONITORING) kit, Use as instructed, Disp: 1 each, Rfl: 0    ergocalciferol (ERGOCALCIFEROL) 50,000 unit Cap, Take 1 capsule (50,000 Units total) by mouth every 7 days., Disp: 12 capsule, Rfl: 1    fluticasone propionate (FLONASE) 50 mcg/actuation nasal spray, 1 spray (50 mcg total) by Each Nostril route once daily. (Patient taking differently: 1 spray by Each Nostril route as needed.), Disp: 18.2 mL, Rfl: 3    glimepiride (AMARYL) 2 MG tablet, Take 1 tablet (2 mg total) by mouth every morning., Disp: 90 tablet, Rfl: 3    ibuprofen (ADVIL,MOTRIN) 800 MG tablet, Take 800 mg by mouth as needed., Disp: , Rfl: 0    lancets Misc, 1 lancet by Misc.(Non-Drug; Combo Route) route once daily., Disp: 100 each, Rfl: 6    levocetirizine (XYZAL) 5 MG tablet, Take 1 tablet (5 mg total) by mouth every evening., Disp: 90 tablet, Rfl: 3    magnesium 250 mg Tab, Take 1 tablet (250 mg total) by mouth once daily., Disp: 90 tablet, Rfl: 3    metFORMIN (GLUCOPHAGE) 1000 MG tablet, Take 1 tablet (1,000 mg total) by mouth 2 (two) times daily., Disp: 180 tablet, Rfl: 3    montelukast (SINGULAIR) 10 mg tablet, Take 1 tablet (10 mg total) by mouth every evening., Disp: 90 tablet, Rfl: 3    mupirocin (BACTROBAN) 2 % ointment, Apply topically once daily. Apply to affected  area, Disp: , Rfl: 0    olopatadine (PATADAY) 0.2 % Drop, Place 1 drop into both eyes once daily., Disp: 2.5 mL, Rfl: 3    potassium chloride SA (K-DUR,KLOR-CON) 10 MEQ tablet, Take 1 tablet (10 mEq total) by mouth 2 (two) times daily., Disp: 180 tablet, Rfl: 0    rosuvastatin (CRESTOR) 10 MG tablet, Take 1 tablet (10 mg total) by mouth every evening., Disp: 90 tablet, Rfl: 3    telmisartan-hydrochlorothiazide (MICARDIS HCT) 80-25 mg per tablet, Take 1 tablet by mouth every morning., Disp: 90 tablet, Rfl: 3    TRUE METRIX GLUCOSE TEST STRIP Strp, USE 1 STRIP TO TEST BLOOD SUGAR ONCE DAILY, Disp: 100 strip, Rfl: 0    Vitals:    02/21/22 0856   BP: 120/83   BP Location: Right arm   Patient Position: Sitting   BP Method: Large (Automatic)   Pulse: 92   Weight: 102.7 kg (226 lb 6.6 oz)   Height: 5' (1.524 m)       Physical Exam  Vitals and nursing note reviewed.   Constitutional:       Appearance: She is well-developed.   HENT:      Head: Normocephalic and atraumatic.   Eyes:      Pupils: Pupils are equal, round, and reactive to light.   Cardiovascular:      Rate and Rhythm: Normal rate.   Pulmonary:      Effort: Pulmonary effort is normal.   Musculoskeletal:         General: Normal range of motion.      Cervical back: Normal range of motion and neck supple.   Skin:     General: Skin is warm and dry.   Neurological:      Mental Status: She is alert and oriented to person, place, and time.      Coordination: Finger-Nose-Finger Test, Heel to Shin Test and Romberg Test normal.      Gait: Gait is intact. Tandem walk normal.      Deep Tendon Reflexes:      Reflex Scores:       Tricep reflexes are 2+ on the right side and 2+ on the left side.       Bicep reflexes are 2+ on the right side and 2+ on the left side.       Brachioradialis reflexes are 2+ on the right side and 2+ on the left side.       Patellar reflexes are 2+ on the right side and 2+ on the left side.       Achilles reflexes are 2+ on the right side and 2+  on the left side.  Psychiatric:         Speech: Speech normal.         Behavior: Behavior normal.         Thought Content: Thought content normal.         Judgment: Judgment normal.         Neurologic Exam     Mental Status   Oriented to person, place, and time.   Oriented to person.   Oriented to place.   Oriented to time.   Follows 3 step commands.   Attention: normal. Concentration: normal.   Speech: speech is normal   Level of consciousness: alert  Knowledge: consistent with education.   Able to name object. Able to read. Able to repeat. Able to write. Normal comprehension.     Cranial Nerves     CN II   Visual acuity: normal  Right visual field deficit: none  Left visual field deficit: none     CN III, IV, VI   Pupils are equal, round, and reactive to light.  Right pupil: Size: 3 mm. Shape: regular. Reactivity: brisk. Consensual response: intact.   Left pupil: Size: 3 mm. Shape: regular. Reactivity: brisk. Consensual response: intact.   CN III: no CN III palsy  CN VI: no CN VI palsy  Nystagmus: none   Diplopia: none  Ophthalmoparesis: none  Conjugate gaze: present    CN V   Right facial sensation deficit: none  Left facial sensation deficit: none    CN VII   Right facial weakness: none  Left facial weakness: none    CN VIII   Hearing: intact    CN IX, X   CN IX normal.   CN X normal.     CN XI   Right sternocleidomastoid strength: normal  Left sternocleidomastoid strength: normal  Right trapezius strength: normal  Left trapezius strength: normal    CN XII   Fasciculations: absent  Tongue deviation: none    Motor Exam   Muscle bulk: normal  Overall muscle tone: normal  Right arm pronator drift: absent  Left arm pronator drift: absent    Strength   Right neck flexion: 5/5  Left neck flexion: 5/5  Right neck extension: 5/5  Left neck extension: 5/5  Right deltoid: 5/5  Left deltoid: 5/5  Right biceps: 5/5  Left biceps: 5/5  Right triceps: 5/5  Left triceps: 5/5  Right wrist flexion: 5/5  Left wrist flexion:  5/5  Right wrist extension: 5/5  Left wrist extension: 5/5  Right interossei: 5/5  Left interossei: 5/5  Right abdominals: 5/5  Left abdominals: 5/5  Right iliopsoas: 5/5  Left iliopsoas: 5/5  Right quadriceps: 5/5  Left quadriceps: 5/5  Right hamstrin/5  Left hamstrin5  Right glutei: 5/5  Left glutei: 5/5  Right anterior tibial: 55  Left anterior tibial: 55  Right posterior tibial: 5/5  Left posterior tibial: 55  Right peroneal: 55  Left peroneal: 55  Right gastroc: 55  Left gastroc: 5    Sensory Exam   Right arm light touch: normal  Left arm light touch: normal  Right leg light touch: normal  Left leg light touch: normal  Right arm vibration: normal  Left arm vibration: normal  Right arm pinprick: normal  Left arm pinprick: normal    Gait, Coordination, and Reflexes     Gait  Gait: normal    Coordination   Romberg: negative  Finger to nose coordination: normal  Heel to shin coordination: normal  Tandem walking coordination: normal    Tremor   Resting tremor: absent  Intention tremor: absent  Action tremor: absent    Reflexes   Right brachioradialis: 2+  Left brachioradialis: 2+  Right biceps: 2+  Left biceps: 2+  Right triceps: 2+  Left triceps: 2+  Right patellar: 2+  Left patellar: 2+  Right achilles: 2+  Left achilles: 2+  Right Eaton: absent  Left Eaton: absent  Right ankle clonus: absent  Left ankle clonus: absent      Provider dictation:    56 year old female with headaches, hypertension, atrial fibrillation (loop recorder), obesity, diabetes presents for follow up of left shoulder/ arm pain.  About 1 year ago she felt cramping in the neck it gradually changed to pain in the left shoulder and interscapular region.  Pain really became more noticeable 6-8 months ago.  She has pain in the left interscapular region with radation to the left biceps area and some below the elbow.  It is constant low level pain, but most noticeable and increased with lifting the left arm.  She has had a few  incidences of numbness/ tingling in the left arm.  Pain increases with rotation of the left shoulder.    She has been undergoing PT with some improvement, but pain still remains in the same area.    Current medications:  Advil, tylenol  Medications tried:  Medrol taper completed  Physical therapy:  Undergoing with some improvement, but pain still present.  Interventional Procedures:  none     On exam, there is 5/5 strength with 2+ DTR and no sensory deficits.  Gait and station fluid.  Denies bowel/ bladder dysfunction.  Full range of motion of the upper and lower extremities. No pain with axial facet loading.  No SI joint pain on palpation.  No pain with lumbar flexion/ extension.    Xray cervical spine from 3/29/18 reviewed.  There is mild spondylosis.  Minimal disk space narrowing at C6/7.    Xray cervical spine from 1-10-22 reviewed.  There is anterior osteophyte formation at C3/C4 through C6/C7.  Mid cervical facet degeneration is present with mild foraminal narrowing seen at C4/C5 through C6/C7.    Ms. Green has acute onset pain in the left scapular/ shoulder arm.  Possible cervical radiculopathy without neurological deficits.   Also cannot rule out rotator cuff issues.   PT is helping some, but pain still persists and she would like further evaluation.  I recommend proceeding with MRI cervical spine to further assess.  Follow up after MRI.      Visit Diagnosis:  Left arm pain  -     MRI Cervical Spine Without Contrast; Future; Expected date: 02/21/2022    Muscle spasm of shoulder region  -     MRI Cervical Spine Without Contrast; Future; Expected date: 02/21/2022    Cervical spondylosis  -     MRI Cervical Spine Without Contrast; Future; Expected date: 02/21/2022        Total time spent counseling greater than fifty percent of total visit time.  Counseling included discussion regarding imaging findings, diagnosis possibilities, treatment options, risks and benefits.   The patient had many questions regarding  the options and long-term effects.

## 2022-03-10 ENCOUNTER — HOSPITAL ENCOUNTER (OUTPATIENT)
Dept: RADIOLOGY | Facility: HOSPITAL | Age: 57
Discharge: HOME | End: 2022-03-10
Attending: PHYSICIAN ASSISTANT
Payer: OTHER GOVERNMENT

## 2022-03-10 DIAGNOSIS — M79.602 LEFT ARM PAIN: ICD-10-CM

## 2022-03-10 DIAGNOSIS — M62.838 MUSCLE SPASM OF SHOULDER REGION: ICD-10-CM

## 2022-03-10 DIAGNOSIS — M47.812 CERVICAL SPONDYLOSIS: ICD-10-CM

## 2022-03-10 PROCEDURE — 72141 MRI NECK SPINE W/O DYE: CPT | Mod: TC,PO

## 2022-03-10 PROCEDURE — 72141 MRI NECK SPINE W/O DYE: CPT | Mod: 26,,, | Performed by: RADIOLOGY

## 2022-03-10 PROCEDURE — 72141 MRI CERVICAL SPINE WITHOUT CONTRAST: ICD-10-PCS | Mod: 26,,, | Performed by: RADIOLOGY

## 2022-03-11 ENCOUNTER — OFFICE VISIT (OUTPATIENT)
Dept: SPINE | Facility: CLINIC | Age: 57
End: 2022-03-11
Payer: OTHER GOVERNMENT

## 2022-03-11 VITALS
SYSTOLIC BLOOD PRESSURE: 118 MMHG | BODY MASS INDEX: 44.37 KG/M2 | WEIGHT: 226 LBS | DIASTOLIC BLOOD PRESSURE: 87 MMHG | RESPIRATION RATE: 16 BRPM | HEIGHT: 60 IN | HEART RATE: 87 BPM

## 2022-03-11 DIAGNOSIS — M54.12 CERVICAL RADICULOPATHY: ICD-10-CM

## 2022-03-11 DIAGNOSIS — M47.812 CERVICAL SPONDYLOSIS: Primary | ICD-10-CM

## 2022-03-11 PROCEDURE — 99999 PR PBB SHADOW E&M-EST. PATIENT-LVL IV: ICD-10-PCS | Mod: PBBFAC,,, | Performed by: PHYSICIAN ASSISTANT

## 2022-03-11 PROCEDURE — 99214 PR OFFICE/OUTPT VISIT, EST, LEVL IV, 30-39 MIN: ICD-10-PCS | Mod: S$PBB,,, | Performed by: PHYSICIAN ASSISTANT

## 2022-03-11 PROCEDURE — 99214 OFFICE O/P EST MOD 30 MIN: CPT | Mod: PBBFAC,PN | Performed by: PHYSICIAN ASSISTANT

## 2022-03-11 PROCEDURE — 99214 OFFICE O/P EST MOD 30 MIN: CPT | Mod: S$PBB,,, | Performed by: PHYSICIAN ASSISTANT

## 2022-03-11 PROCEDURE — 99999 PR PBB SHADOW E&M-EST. PATIENT-LVL IV: CPT | Mod: PBBFAC,,, | Performed by: PHYSICIAN ASSISTANT

## 2022-03-11 NOTE — H&P (VIEW-ONLY)
Back and Spine Follow Up    Patient ID: Norma Call is a 56 y.o. female.    Chief Complaint   Patient presents with    Shoulder Pain     Left arm pain f/u MRI        Review of Systems   Constitutional: Negative for activity change, appetite change, chills, fever and unexpected weight change.   HENT: Negative for tinnitus, trouble swallowing and voice change.    Respiratory: Negative for apnea, cough, chest tightness and shortness of breath.    Cardiovascular: Negative for chest pain and palpitations.   Gastrointestinal: Negative for constipation, diarrhea, nausea and vomiting.   Genitourinary: Negative for difficulty urinating, dysuria, frequency and urgency.   Musculoskeletal: Positive for arthralgias, myalgias and neck pain. Negative for back pain, gait problem and neck stiffness.   Skin: Negative for wound.   Neurological: Positive for numbness. Negative for dizziness, tremors, seizures, facial asymmetry, speech difficulty, weakness, light-headedness and headaches.   Psychiatric/Behavioral: Negative for confusion and decreased concentration.       Past Medical History:   Diagnosis Date    a Family H/O CAD ###    On ASA 81 Mg Daily; 7/2/21 Cardiac CT Ca+ Score = 0 (See Report); 3/20/13 LCST (Dr. CLARA Treviño) = Entirely Normal; 3/20/13 Echo (Dr. CLARA Treviño) = Normal With EF 60-65%, With Normal Biventricular Size And Function, And With No Diastolic Dysfuncion     b Chronic Hypokalemia     3/30/17 Increased Her 10 mEq KCl Daily To Bid; Is Likely Due To Her Losartan/HCTZ 100/12.5 Mg qAM    b Hypertension     12/6/19 Added Norvasc 5 Mg qHS; 3/30/17 Increased Losartan/HCTZ 100/12.5 To 100/25 Mg qAM; 12/6/19 RXd A Low Salt Diet    c Hypercholesteremia     6/15/20 RXd Crestor 10 Mg qHS And D/Cd Pravastatin (Was Ineffective); 1/19/20 Increased 10 Mg Pravastatin To 40 Mg qHS    d Type 2 Diabetes Mellitus     12/21/21 Increased Amaryl To 2 Mg qAM; 7/4/20 Added Amaryl 1 Mg qAM; On Metformin- Mg Daily    e Family H/O  "Thyroid Disorder     f Borderline Osteopenia     5/5/18 Continued Her OTC D3 50K IU Weekly, And RXd OTC CaCO3 1,200 Mg Daily    f Obesity     10/2/17 RXd Lifestyle Changes; 5/10/16 BMI = 39.26    g Chronic Mild Thrombocytosis     Am Monitoring    i H/O COVID-19 Infection     In 01/2021    i Obstructive Sleep Apnea ###    Dr. Caroline Woodruff On 8/1/13 Scheduled A CPAP Titration Study    j Family H/O Colon Cancer ###    Dr. William Ruiz    j H/O Hyperplastic Colon Polyp On 4/30/15 TC     Dr. William Ruiz: "Repeat TC In 5 YRs"    j Sigmoid Diverticulosis     Dr. William Ruiz    k H/O Abnormal PAP Smear     Dr. Phillip Jj; Repeats Have Been Normal    k Hemorrhagic Right Renal Cyst     Dr. Verna Alicea On 11/15/16 Does NOT Suspect Renal Cell CA; 03/2016 Renal U/S And 05/2016 MRI Were Benign    l H/O Left Ankle And Foot Fracture Repair SX 09/2015     Dr. Page In [place]; She Has Been Having Heel Pain Since He Removed The Screws    m Headaches     Takes Ibuprofen 800 Mg PRN And Tramadol 50 Mg qHS PRN    o Allergic Rhinosinusitis     q Acne Vulgaris     q Vitamin D Deficiency     On Vitamin D3 50K Weekly     Wellness Visit 6/17/2021      Social History     Socioeconomic History    Marital status:      Spouse name: Ulysses    Number of children: 3   Occupational History    Occupation: nurse   Tobacco Use    Smoking status: Never Smoker    Smokeless tobacco: Never Used   Substance and Sexual Activity    Alcohol use: No    Drug use: No    Sexual activity: Not Currently     Family History   Problem Relation Age of Onset    Diabetes Maternal Grandmother     Stroke Father     Colon cancer Brother     Diabetes Brother     Hypertension Brother     Cancer Brother     Heart failure Mother     Liver disease Daughter      Review of patient's allergies indicates:   Allergen Reactions    Percocet [oxycodone-acetaminophen] Nausea And Vomiting    Codeine Palpitations "       Current Outpatient Medications:     amitriptyline (ELAVIL) 10 MG tablet, Take 1 tablet (10 mg total) by mouth nightly as needed for Insomnia or Pain. (Patient not taking: Reported on 3/10/2022), Disp: 90 tablet, Rfl: 3    amLODIPine (NORVASC) 2.5 MG tablet, Take 1 tablet (2.5 mg total) by mouth once daily., Disp: 90 tablet, Rfl: 0    amLODIPine (NORVASC) 5 MG tablet, Take 1 tablet (5 mg total) by mouth once daily., Disp: 90 tablet, Rfl: 0    aspirin (ECOTRIN) 81 MG EC tablet, Take 1 tablet (81 mg total) by mouth once daily., Disp: , Rfl: 0    azelastine (ASTELIN) 137 mcg (0.1 %) nasal spray, 1 spray (137 mcg total) by Nasal route as needed for Rhinitis., Disp: 30 mL, Rfl: 3    blood-glucose meter (BLOOD GLUCOSE MONITORING) kit, Use as instructed, Disp: 1 each, Rfl: 0    ergocalciferol (ERGOCALCIFEROL) 50,000 unit Cap, Take 1 capsule (50,000 Units total) by mouth every 7 days., Disp: 12 capsule, Rfl: 1    fluticasone propionate (FLONASE) 50 mcg/actuation nasal spray, 1 spray (50 mcg total) by Each Nostril route once daily., Disp: 18.2 mL, Rfl: 3    glimepiride (AMARYL) 2 MG tablet, Take 1 tablet (2 mg total) by mouth every morning., Disp: 90 tablet, Rfl: 3    ibuprofen (ADVIL,MOTRIN) 800 MG tablet, Take 800 mg by mouth as needed., Disp: , Rfl: 0    lancets Misc, 1 lancet by Misc.(Non-Drug; Combo Route) route once daily., Disp: 100 each, Rfl: 6    levocetirizine (XYZAL) 5 MG tablet, Take 1 tablet (5 mg total) by mouth every evening., Disp: 90 tablet, Rfl: 3    magnesium 250 mg Tab, Take 1 tablet (250 mg total) by mouth once daily., Disp: 90 tablet, Rfl: 3    metFORMIN (GLUCOPHAGE) 1000 MG tablet, Take 1 tablet (1,000 mg total) by mouth 2 (two) times daily., Disp: 180 tablet, Rfl: 3    montelukast (SINGULAIR) 10 mg tablet, Take 1 tablet (10 mg total) by mouth every evening., Disp: 90 tablet, Rfl: 3    mupirocin (BACTROBAN) 2 % ointment, Apply topically once daily. Apply to affected area, Disp: ,  Rfl: 0    olopatadine (PATADAY) 0.2 % Drop, Place 1 drop into both eyes once daily., Disp: 2.5 mL, Rfl: 3    potassium chloride SA (K-DUR,KLOR-CON) 10 MEQ tablet, Take 1 tablet (10 mEq total) by mouth 2 (two) times daily., Disp: 180 tablet, Rfl: 0    rosuvastatin (CRESTOR) 10 MG tablet, Take 1 tablet (10 mg total) by mouth every evening., Disp: 90 tablet, Rfl: 3    telmisartan-hydrochlorothiazide (MICARDIS HCT) 80-25 mg per tablet, Take 1 tablet by mouth every morning., Disp: 90 tablet, Rfl: 3    TRUE METRIX GLUCOSE TEST STRIP Strp, USE 1 STRIP TO TEST BLOOD SUGAR ONCE DAILY, Disp: 100 strip, Rfl: 0    Vitals:    03/11/22 1318   BP: 118/87   Pulse: 87   Resp: 16   Weight: 102.5 kg (226 lb)   Height: 5' (1.524 m)       Physical Exam  Vitals and nursing note reviewed.   Constitutional:       Appearance: She is well-developed.   HENT:      Head: Normocephalic and atraumatic.   Eyes:      Pupils: Pupils are equal, round, and reactive to light.   Cardiovascular:      Rate and Rhythm: Normal rate.   Pulmonary:      Effort: Pulmonary effort is normal.   Musculoskeletal:         General: Normal range of motion.      Cervical back: Normal range of motion and neck supple.   Skin:     General: Skin is warm and dry.   Neurological:      Mental Status: She is alert and oriented to person, place, and time.      Coordination: Finger-Nose-Finger Test, Heel to Shin Test and Romberg Test normal.      Gait: Gait is intact. Tandem walk normal.      Deep Tendon Reflexes:      Reflex Scores:       Tricep reflexes are 2+ on the right side and 2+ on the left side.       Bicep reflexes are 2+ on the right side and 2+ on the left side.       Brachioradialis reflexes are 2+ on the right side and 2+ on the left side.       Patellar reflexes are 2+ on the right side and 2+ on the left side.       Achilles reflexes are 2+ on the right side and 2+ on the left side.  Psychiatric:         Speech: Speech normal.         Behavior: Behavior  normal.         Thought Content: Thought content normal.         Judgment: Judgment normal.         Neurologic Exam     Mental Status   Oriented to person, place, and time.   Oriented to person.   Oriented to place.   Oriented to time.   Follows 3 step commands.   Attention: normal. Concentration: normal.   Speech: speech is normal   Level of consciousness: alert  Knowledge: consistent with education.   Able to name object. Able to read. Able to repeat. Able to write. Normal comprehension.     Cranial Nerves     CN II   Visual acuity: normal  Right visual field deficit: none  Left visual field deficit: none     CN III, IV, VI   Pupils are equal, round, and reactive to light.  Right pupil: Size: 3 mm. Shape: regular. Reactivity: brisk. Consensual response: intact.   Left pupil: Size: 3 mm. Shape: regular. Reactivity: brisk. Consensual response: intact.   CN III: no CN III palsy  CN VI: no CN VI palsy  Nystagmus: none   Diplopia: none  Ophthalmoparesis: none  Conjugate gaze: present    CN V   Right facial sensation deficit: none  Left facial sensation deficit: none    CN VII   Right facial weakness: none  Left facial weakness: none    CN VIII   Hearing: intact    CN IX, X   CN IX normal.   CN X normal.     CN XI   Right sternocleidomastoid strength: normal  Left sternocleidomastoid strength: normal  Right trapezius strength: normal  Left trapezius strength: normal    CN XII   Fasciculations: absent  Tongue deviation: none    Motor Exam   Muscle bulk: normal  Overall muscle tone: normal  Right arm pronator drift: absent  Left arm pronator drift: absent    Strength   Right neck flexion: 5/5  Left neck flexion: 5/5  Right neck extension: 5/5  Left neck extension: 5/5  Right deltoid: 5/5  Left deltoid: 5/5  Right biceps: 5/5  Left biceps: 5/5  Right triceps: 5/5  Left triceps: 5/5  Right wrist flexion: 5/5  Left wrist flexion: 5/5  Right wrist extension: 5/5  Left wrist extension: 5/5  Right interossei: 5/5  Left  interossei: 5/5  Right abdominals: 5/5  Left abdominals: 5/5  Right iliopsoas: 5/5  Left iliopsoas: 5/5  Right quadriceps: 5/5  Left quadriceps: 5/5  Right hamstrin/5  Left hamstrin/5  Right glutei: 5/5  Left glutei: 5/5  Right anterior tibial: 5/5  Left anterior tibial: 5/5  Right posterior tibial: 5/5  Left posterior tibial: 5/5  Right peroneal: 5/5  Left peroneal: 5/5  Right gastroc: 5/5  Left gastroc: 5/5    Sensory Exam   Right arm light touch: normal  Left arm light touch: normal  Right leg light touch: normal  Left leg light touch: normal  Right arm vibration: normal  Left arm vibration: normal  Right arm pinprick: normal  Left arm pinprick: normal    Gait, Coordination, and Reflexes     Gait  Gait: normal    Coordination   Romberg: negative  Finger to nose coordination: normal  Heel to shin coordination: normal  Tandem walking coordination: normal    Tremor   Resting tremor: absent  Intention tremor: absent  Action tremor: absent    Reflexes   Right brachioradialis: 2+  Left brachioradialis: 2+  Right biceps: 2+  Left biceps: 2+  Right triceps: 2+  Left triceps: 2+  Right patellar: 2+  Left patellar: 2+  Right achilles: 2+  Left achilles: 2+  Right Eaton: absent  Left Eaton: absent  Right ankle clonus: absent  Left ankle clonus: absent      Provider dictation:    56 year old female with headaches, hypertension, atrial fibrillation (loop recorder), obesity, diabetes presents for follow up of left shoulder/ arm pain after undergoing imaging.  There have been no significant changes since last visit.    About 1 year ago she felt cramping in the neck it gradually changed to pain in the left shoulder and interscapular region.  Pain really became more noticeable about 8 months ago.  She has pain in the left interscapular region with radation to the left biceps area and some below the elbow.  It is constant low level pain, but most noticeable and increased with lifting the left arm.  She has had a few  incidences of numbness/ tingling in the left arm.  Pain increases with rotation of the left shoulder.    She has been undergoing PT with some improvement, but pain still remains in the same area.    Current medications:  Advil, tylenol  Medications tried:  Medrol taper completed  Physical therapy:  Undergoing with some improvement, but pain still present.  Interventional Procedures:  none     On exam, there is 5/5 strength with 2+ DTR and no sensory deficits.  Gait and station fluid.  Denies bowel/ bladder dysfunction.  Full range of motion of the upper and lower extremities. No pain with axial facet loading.  No SI joint pain on palpation.  No pain with lumbar flexion/ extension.    Xray cervical spine from 3/29/18 reviewed.  There is mild spondylosis.  Minimal disk space narrowing at C6/7.    Xray cervical spine from 1-10-22 reviewed.  There is anterior osteophyte formation at C3/C4 through C6/C7.  Mid cervical facet degeneration is present with mild foraminal narrowing seen at C4/C5 through C6/C7.    MRI cervical spine without contrast from 3-10-22 reveiwed.  There is significant motion artifact on the axial views.  Apparent congenital stenosi.  There is C4/5 disk/ osteophyte with cord flattening anteriorly with no cord signal change.  Some denerative changes with C3/4 moderate right foraminal narrowing.  C4/5 left foraminal narrowing.  C5/6 mild left foraminal narrowing.      Ms. Green has acute onset pain in the left scapular/ shoulder arm.  Likely left C5 radiculopathy.  Also discussed possible shoulder pathology and we could refer to orthopedics - she wants to hold.  Discussed cervical UGO to be both diagnostic and therapeutic.  She would like to proceed.  When with limited MRI, she has no myelopathic findings and no focal neurological deficits on exam and no noted significnat cord compression on imaging.  Discussed with pain management.  Patient wants to proceed.  Will schedule C6/7 IL UGO.  Follow up after  UGO.       Visit Diagnosis:  Cervical spondylosis  -     Procedure Order to Pain Management; Future; Expected date: 03/11/2022    Cervical radiculopathy  -     Procedure Order to Pain Management; Future; Expected date: 03/11/2022        Total time spent counseling greater than fifty percent of total visit time.  Counseling included discussion regarding imaging findings, diagnosis possibilities, treatment options, risks and benefits.   The patient had many questions regarding the options and long-term effects.

## 2022-03-11 NOTE — PROGRESS NOTES
Back and Spine Follow Up    Patient ID: Norma Call is a 56 y.o. female.    Chief Complaint   Patient presents with    Shoulder Pain     Left arm pain f/u MRI        Review of Systems   Constitutional: Negative for activity change, appetite change, chills, fever and unexpected weight change.   HENT: Negative for tinnitus, trouble swallowing and voice change.    Respiratory: Negative for apnea, cough, chest tightness and shortness of breath.    Cardiovascular: Negative for chest pain and palpitations.   Gastrointestinal: Negative for constipation, diarrhea, nausea and vomiting.   Genitourinary: Negative for difficulty urinating, dysuria, frequency and urgency.   Musculoskeletal: Positive for arthralgias, myalgias and neck pain. Negative for back pain, gait problem and neck stiffness.   Skin: Negative for wound.   Neurological: Positive for numbness. Negative for dizziness, tremors, seizures, facial asymmetry, speech difficulty, weakness, light-headedness and headaches.   Psychiatric/Behavioral: Negative for confusion and decreased concentration.       Past Medical History:   Diagnosis Date    a Family H/O CAD ###    On ASA 81 Mg Daily; 7/2/21 Cardiac CT Ca+ Score = 0 (See Report); 3/20/13 LCST (Dr. CLARA Treviño) = Entirely Normal; 3/20/13 Echo (Dr. CLARA Treviño) = Normal With EF 60-65%, With Normal Biventricular Size And Function, And With No Diastolic Dysfuncion     b Chronic Hypokalemia     3/30/17 Increased Her 10 mEq KCl Daily To Bid; Is Likely Due To Her Losartan/HCTZ 100/12.5 Mg qAM    b Hypertension     12/6/19 Added Norvasc 5 Mg qHS; 3/30/17 Increased Losartan/HCTZ 100/12.5 To 100/25 Mg qAM; 12/6/19 RXd A Low Salt Diet    c Hypercholesteremia     6/15/20 RXd Crestor 10 Mg qHS And D/Cd Pravastatin (Was Ineffective); 1/19/20 Increased 10 Mg Pravastatin To 40 Mg qHS    d Type 2 Diabetes Mellitus     12/21/21 Increased Amaryl To 2 Mg qAM; 7/4/20 Added Amaryl 1 Mg qAM; On Metformin- Mg Daily    e Family H/O  "Thyroid Disorder     f Borderline Osteopenia     5/5/18 Continued Her OTC D3 50K IU Weekly, And RXd OTC CaCO3 1,200 Mg Daily    f Obesity     10/2/17 RXd Lifestyle Changes; 5/10/16 BMI = 39.26    g Chronic Mild Thrombocytosis     Am Monitoring    i H/O COVID-19 Infection     In 01/2021    i Obstructive Sleep Apnea ###    Dr. Caroline Woodruff On 8/1/13 Scheduled A CPAP Titration Study    j Family H/O Colon Cancer ###    Dr. William Ruiz    j H/O Hyperplastic Colon Polyp On 4/30/15 TC     Dr. William Ruiz: "Repeat TC In 5 YRs"    j Sigmoid Diverticulosis     Dr. William Ruiz    k H/O Abnormal PAP Smear     Dr. Phillip Jj; Repeats Have Been Normal    k Hemorrhagic Right Renal Cyst     Dr. Verna Alicea On 11/15/16 Does NOT Suspect Renal Cell CA; 03/2016 Renal U/S And 05/2016 MRI Were Benign    l H/O Left Ankle And Foot Fracture Repair SX 09/2015     Dr. Page In [place]; She Has Been Having Heel Pain Since He Removed The Screws    m Headaches     Takes Ibuprofen 800 Mg PRN And Tramadol 50 Mg qHS PRN    o Allergic Rhinosinusitis     q Acne Vulgaris     q Vitamin D Deficiency     On Vitamin D3 50K Weekly     Wellness Visit 6/17/2021      Social History     Socioeconomic History    Marital status:      Spouse name: Ulysses    Number of children: 3   Occupational History    Occupation: nurse   Tobacco Use    Smoking status: Never Smoker    Smokeless tobacco: Never Used   Substance and Sexual Activity    Alcohol use: No    Drug use: No    Sexual activity: Not Currently     Family History   Problem Relation Age of Onset    Diabetes Maternal Grandmother     Stroke Father     Colon cancer Brother     Diabetes Brother     Hypertension Brother     Cancer Brother     Heart failure Mother     Liver disease Daughter      Review of patient's allergies indicates:   Allergen Reactions    Percocet [oxycodone-acetaminophen] Nausea And Vomiting    Codeine Palpitations "       Current Outpatient Medications:     amitriptyline (ELAVIL) 10 MG tablet, Take 1 tablet (10 mg total) by mouth nightly as needed for Insomnia or Pain. (Patient not taking: Reported on 3/10/2022), Disp: 90 tablet, Rfl: 3    amLODIPine (NORVASC) 2.5 MG tablet, Take 1 tablet (2.5 mg total) by mouth once daily., Disp: 90 tablet, Rfl: 0    amLODIPine (NORVASC) 5 MG tablet, Take 1 tablet (5 mg total) by mouth once daily., Disp: 90 tablet, Rfl: 0    aspirin (ECOTRIN) 81 MG EC tablet, Take 1 tablet (81 mg total) by mouth once daily., Disp: , Rfl: 0    azelastine (ASTELIN) 137 mcg (0.1 %) nasal spray, 1 spray (137 mcg total) by Nasal route as needed for Rhinitis., Disp: 30 mL, Rfl: 3    blood-glucose meter (BLOOD GLUCOSE MONITORING) kit, Use as instructed, Disp: 1 each, Rfl: 0    ergocalciferol (ERGOCALCIFEROL) 50,000 unit Cap, Take 1 capsule (50,000 Units total) by mouth every 7 days., Disp: 12 capsule, Rfl: 1    fluticasone propionate (FLONASE) 50 mcg/actuation nasal spray, 1 spray (50 mcg total) by Each Nostril route once daily., Disp: 18.2 mL, Rfl: 3    glimepiride (AMARYL) 2 MG tablet, Take 1 tablet (2 mg total) by mouth every morning., Disp: 90 tablet, Rfl: 3    ibuprofen (ADVIL,MOTRIN) 800 MG tablet, Take 800 mg by mouth as needed., Disp: , Rfl: 0    lancets Misc, 1 lancet by Misc.(Non-Drug; Combo Route) route once daily., Disp: 100 each, Rfl: 6    levocetirizine (XYZAL) 5 MG tablet, Take 1 tablet (5 mg total) by mouth every evening., Disp: 90 tablet, Rfl: 3    magnesium 250 mg Tab, Take 1 tablet (250 mg total) by mouth once daily., Disp: 90 tablet, Rfl: 3    metFORMIN (GLUCOPHAGE) 1000 MG tablet, Take 1 tablet (1,000 mg total) by mouth 2 (two) times daily., Disp: 180 tablet, Rfl: 3    montelukast (SINGULAIR) 10 mg tablet, Take 1 tablet (10 mg total) by mouth every evening., Disp: 90 tablet, Rfl: 3    mupirocin (BACTROBAN) 2 % ointment, Apply topically once daily. Apply to affected area, Disp: ,  Rfl: 0    olopatadine (PATADAY) 0.2 % Drop, Place 1 drop into both eyes once daily., Disp: 2.5 mL, Rfl: 3    potassium chloride SA (K-DUR,KLOR-CON) 10 MEQ tablet, Take 1 tablet (10 mEq total) by mouth 2 (two) times daily., Disp: 180 tablet, Rfl: 0    rosuvastatin (CRESTOR) 10 MG tablet, Take 1 tablet (10 mg total) by mouth every evening., Disp: 90 tablet, Rfl: 3    telmisartan-hydrochlorothiazide (MICARDIS HCT) 80-25 mg per tablet, Take 1 tablet by mouth every morning., Disp: 90 tablet, Rfl: 3    TRUE METRIX GLUCOSE TEST STRIP Strp, USE 1 STRIP TO TEST BLOOD SUGAR ONCE DAILY, Disp: 100 strip, Rfl: 0    Vitals:    03/11/22 1318   BP: 118/87   Pulse: 87   Resp: 16   Weight: 102.5 kg (226 lb)   Height: 5' (1.524 m)       Physical Exam  Vitals and nursing note reviewed.   Constitutional:       Appearance: She is well-developed.   HENT:      Head: Normocephalic and atraumatic.   Eyes:      Pupils: Pupils are equal, round, and reactive to light.   Cardiovascular:      Rate and Rhythm: Normal rate.   Pulmonary:      Effort: Pulmonary effort is normal.   Musculoskeletal:         General: Normal range of motion.      Cervical back: Normal range of motion and neck supple.   Skin:     General: Skin is warm and dry.   Neurological:      Mental Status: She is alert and oriented to person, place, and time.      Coordination: Finger-Nose-Finger Test, Heel to Shin Test and Romberg Test normal.      Gait: Gait is intact. Tandem walk normal.      Deep Tendon Reflexes:      Reflex Scores:       Tricep reflexes are 2+ on the right side and 2+ on the left side.       Bicep reflexes are 2+ on the right side and 2+ on the left side.       Brachioradialis reflexes are 2+ on the right side and 2+ on the left side.       Patellar reflexes are 2+ on the right side and 2+ on the left side.       Achilles reflexes are 2+ on the right side and 2+ on the left side.  Psychiatric:         Speech: Speech normal.         Behavior: Behavior  normal.         Thought Content: Thought content normal.         Judgment: Judgment normal.         Neurologic Exam     Mental Status   Oriented to person, place, and time.   Oriented to person.   Oriented to place.   Oriented to time.   Follows 3 step commands.   Attention: normal. Concentration: normal.   Speech: speech is normal   Level of consciousness: alert  Knowledge: consistent with education.   Able to name object. Able to read. Able to repeat. Able to write. Normal comprehension.     Cranial Nerves     CN II   Visual acuity: normal  Right visual field deficit: none  Left visual field deficit: none     CN III, IV, VI   Pupils are equal, round, and reactive to light.  Right pupil: Size: 3 mm. Shape: regular. Reactivity: brisk. Consensual response: intact.   Left pupil: Size: 3 mm. Shape: regular. Reactivity: brisk. Consensual response: intact.   CN III: no CN III palsy  CN VI: no CN VI palsy  Nystagmus: none   Diplopia: none  Ophthalmoparesis: none  Conjugate gaze: present    CN V   Right facial sensation deficit: none  Left facial sensation deficit: none    CN VII   Right facial weakness: none  Left facial weakness: none    CN VIII   Hearing: intact    CN IX, X   CN IX normal.   CN X normal.     CN XI   Right sternocleidomastoid strength: normal  Left sternocleidomastoid strength: normal  Right trapezius strength: normal  Left trapezius strength: normal    CN XII   Fasciculations: absent  Tongue deviation: none    Motor Exam   Muscle bulk: normal  Overall muscle tone: normal  Right arm pronator drift: absent  Left arm pronator drift: absent    Strength   Right neck flexion: 5/5  Left neck flexion: 5/5  Right neck extension: 5/5  Left neck extension: 5/5  Right deltoid: 5/5  Left deltoid: 5/5  Right biceps: 5/5  Left biceps: 5/5  Right triceps: 5/5  Left triceps: 5/5  Right wrist flexion: 5/5  Left wrist flexion: 5/5  Right wrist extension: 5/5  Left wrist extension: 5/5  Right interossei: 5/5  Left  interossei: 5/5  Right abdominals: 5/5  Left abdominals: 5/5  Right iliopsoas: 5/5  Left iliopsoas: 5/5  Right quadriceps: 5/5  Left quadriceps: 5/5  Right hamstrin/5  Left hamstrin/5  Right glutei: 5/5  Left glutei: 5/5  Right anterior tibial: 5/5  Left anterior tibial: 5/5  Right posterior tibial: 5/5  Left posterior tibial: 5/5  Right peroneal: 5/5  Left peroneal: 5/5  Right gastroc: 5/5  Left gastroc: 5/5    Sensory Exam   Right arm light touch: normal  Left arm light touch: normal  Right leg light touch: normal  Left leg light touch: normal  Right arm vibration: normal  Left arm vibration: normal  Right arm pinprick: normal  Left arm pinprick: normal    Gait, Coordination, and Reflexes     Gait  Gait: normal    Coordination   Romberg: negative  Finger to nose coordination: normal  Heel to shin coordination: normal  Tandem walking coordination: normal    Tremor   Resting tremor: absent  Intention tremor: absent  Action tremor: absent    Reflexes   Right brachioradialis: 2+  Left brachioradialis: 2+  Right biceps: 2+  Left biceps: 2+  Right triceps: 2+  Left triceps: 2+  Right patellar: 2+  Left patellar: 2+  Right achilles: 2+  Left achilles: 2+  Right Eaton: absent  Left Eaton: absent  Right ankle clonus: absent  Left ankle clonus: absent      Provider dictation:    56 year old female with headaches, hypertension, atrial fibrillation (loop recorder), obesity, diabetes presents for follow up of left shoulder/ arm pain after undergoing imaging.  There have been no significant changes since last visit.    About 1 year ago she felt cramping in the neck it gradually changed to pain in the left shoulder and interscapular region.  Pain really became more noticeable about 8 months ago.  She has pain in the left interscapular region with radation to the left biceps area and some below the elbow.  It is constant low level pain, but most noticeable and increased with lifting the left arm.  She has had a few  incidences of numbness/ tingling in the left arm.  Pain increases with rotation of the left shoulder.    She has been undergoing PT with some improvement, but pain still remains in the same area.    Current medications:  Advil, tylenol  Medications tried:  Medrol taper completed  Physical therapy:  Undergoing with some improvement, but pain still present.  Interventional Procedures:  none     On exam, there is 5/5 strength with 2+ DTR and no sensory deficits.  Gait and station fluid.  Denies bowel/ bladder dysfunction.  Full range of motion of the upper and lower extremities. No pain with axial facet loading.  No SI joint pain on palpation.  No pain with lumbar flexion/ extension.    Xray cervical spine from 3/29/18 reviewed.  There is mild spondylosis.  Minimal disk space narrowing at C6/7.    Xray cervical spine from 1-10-22 reviewed.  There is anterior osteophyte formation at C3/C4 through C6/C7.  Mid cervical facet degeneration is present with mild foraminal narrowing seen at C4/C5 through C6/C7.    MRI cervical spine without contrast from 3-10-22 reveiwed.  There is significant motion artifact on the axial views.  Apparent congenital stenosi.  There is C4/5 disk/ osteophyte with cord flattening anteriorly with no cord signal change.  Some denerative changes with C3/4 moderate right foraminal narrowing.  C4/5 left foraminal narrowing.  C5/6 mild left foraminal narrowing.      Ms. Green has acute onset pain in the left scapular/ shoulder arm.  Likely left C5 radiculopathy.  Also discussed possible shoulder pathology and we could refer to orthopedics - she wants to hold.  Discussed cervical UGO to be both diagnostic and therapeutic.  She would like to proceed.  When with limited MRI, she has no myelopathic findings and no focal neurological deficits on exam and no noted significnat cord compression on imaging.  Discussed with pain management.  Patient wants to proceed.  Will schedule C6/7 IL UGO.  Follow up after  UGO.       Visit Diagnosis:  Cervical spondylosis  -     Procedure Order to Pain Management; Future; Expected date: 03/11/2022    Cervical radiculopathy  -     Procedure Order to Pain Management; Future; Expected date: 03/11/2022        Total time spent counseling greater than fifty percent of total visit time.  Counseling included discussion regarding imaging findings, diagnosis possibilities, treatment options, risks and benefits.   The patient had many questions regarding the options and long-term effects.

## 2022-03-14 ENCOUNTER — PATIENT MESSAGE (OUTPATIENT)
Dept: PAIN MEDICINE | Facility: CLINIC | Age: 57
End: 2022-03-14
Payer: OTHER GOVERNMENT

## 2022-03-14 ENCOUNTER — TELEPHONE (OUTPATIENT)
Dept: PAIN MEDICINE | Facility: CLINIC | Age: 57
End: 2022-03-14
Payer: OTHER GOVERNMENT

## 2022-03-15 ENCOUNTER — PATIENT MESSAGE (OUTPATIENT)
Dept: PAIN MEDICINE | Facility: CLINIC | Age: 57
End: 2022-03-15
Payer: OTHER GOVERNMENT

## 2022-03-15 ENCOUNTER — TELEPHONE (OUTPATIENT)
Dept: PAIN MEDICINE | Facility: CLINIC | Age: 57
End: 2022-03-15
Payer: OTHER GOVERNMENT

## 2022-03-15 NOTE — TELEPHONE ENCOUNTER
Scheduled patient for Cervical Epidural Steroid Injection 3/24. Need ok to stop ASA for 7 days prior.

## 2022-03-16 DIAGNOSIS — M54.12 CERVICAL RADICULOPATHY: Primary | ICD-10-CM

## 2022-03-16 RX ORDER — SODIUM CHLORIDE, SODIUM LACTATE, POTASSIUM CHLORIDE, CALCIUM CHLORIDE 600; 310; 30; 20 MG/100ML; MG/100ML; MG/100ML; MG/100ML
INJECTION, SOLUTION INTRAVENOUS CONTINUOUS
Status: CANCELLED | OUTPATIENT
Start: 2022-03-16

## 2022-03-16 NOTE — TELEPHONE ENCOUNTER
Spoke with patient and notified her that her cardiologist oked her to stop her ASA for 7 days. She voiced understanding regarding this and preop instructions.

## 2022-03-24 ENCOUNTER — HOSPITAL ENCOUNTER (OUTPATIENT)
Dept: RADIOLOGY | Facility: HOSPITAL | Age: 57
Discharge: HOME | End: 2022-03-24
Attending: ANESTHESIOLOGY
Payer: OTHER GOVERNMENT

## 2022-03-24 ENCOUNTER — HOSPITAL ENCOUNTER (OUTPATIENT)
Facility: HOSPITAL | Age: 57
Discharge: HOME | End: 2022-03-24
Attending: ANESTHESIOLOGY | Admitting: ANESTHESIOLOGY
Payer: OTHER GOVERNMENT

## 2022-03-24 DIAGNOSIS — M54.2 NECK PAIN: ICD-10-CM

## 2022-03-24 DIAGNOSIS — M54.12 CERVICAL RADICULOPATHY: ICD-10-CM

## 2022-03-24 LAB — GLUCOSE SERPL-MCNC: 86 MG/DL (ref 70–110)

## 2022-03-24 PROCEDURE — 63600175 PHARM REV CODE 636 W HCPCS: Mod: PO | Performed by: ANESTHESIOLOGY

## 2022-03-24 PROCEDURE — 62321 PR INJ CERV/THORAC, W/GUIDANCE: ICD-10-PCS | Mod: ,,, | Performed by: ANESTHESIOLOGY

## 2022-03-24 PROCEDURE — 25000003 PHARM REV CODE 250: Mod: PO | Performed by: ANESTHESIOLOGY

## 2022-03-24 PROCEDURE — A4216 STERILE WATER/SALINE, 10 ML: HCPCS | Mod: PO | Performed by: ANESTHESIOLOGY

## 2022-03-24 PROCEDURE — 25500020 PHARM REV CODE 255: Mod: PO | Performed by: ANESTHESIOLOGY

## 2022-03-24 PROCEDURE — 82962 GLUCOSE BLOOD TEST: CPT | Mod: PO | Performed by: ANESTHESIOLOGY

## 2022-03-24 PROCEDURE — 62321 NJX INTERLAMINAR CRV/THRC: CPT | Mod: ,,, | Performed by: ANESTHESIOLOGY

## 2022-03-24 PROCEDURE — 62321 NJX INTERLAMINAR CRV/THRC: CPT | Mod: PO | Performed by: ANESTHESIOLOGY

## 2022-03-24 PROCEDURE — 76000 FLUOROSCOPY <1 HR PHYS/QHP: CPT | Mod: TC,PO

## 2022-03-24 RX ORDER — METHYLPREDNISOLONE ACETATE 80 MG/ML
INJECTION, SUSPENSION INTRA-ARTICULAR; INTRALESIONAL; INTRAMUSCULAR; SOFT TISSUE
Status: DISCONTINUED | OUTPATIENT
Start: 2022-03-24 | End: 2022-03-24 | Stop reason: HOSPADM

## 2022-03-24 RX ORDER — LIDOCAINE HYDROCHLORIDE 10 MG/ML
INJECTION, SOLUTION EPIDURAL; INFILTRATION; INTRACAUDAL; PERINEURAL
Status: DISCONTINUED | OUTPATIENT
Start: 2022-03-24 | End: 2022-03-24 | Stop reason: HOSPADM

## 2022-03-24 RX ORDER — SODIUM CHLORIDE, SODIUM LACTATE, POTASSIUM CHLORIDE, CALCIUM CHLORIDE 600; 310; 30; 20 MG/100ML; MG/100ML; MG/100ML; MG/100ML
INJECTION, SOLUTION INTRAVENOUS CONTINUOUS
Status: DISCONTINUED | OUTPATIENT
Start: 2022-03-24 | End: 2022-03-24 | Stop reason: HOSPADM

## 2022-03-24 RX ORDER — LIDOCAINE HYDROCHLORIDE 10 MG/ML
1 INJECTION, SOLUTION EPIDURAL; INFILTRATION; INTRACAUDAL; PERINEURAL ONCE
Status: COMPLETED | OUTPATIENT
Start: 2022-03-24 | End: 2022-03-24

## 2022-03-24 RX ORDER — MIDAZOLAM HYDROCHLORIDE 2 MG/2ML
INJECTION, SOLUTION INTRAMUSCULAR; INTRAVENOUS
Status: DISCONTINUED | OUTPATIENT
Start: 2022-03-24 | End: 2022-03-24 | Stop reason: HOSPADM

## 2022-03-24 RX ORDER — SODIUM CHLORIDE 9 MG/ML
INJECTION, SOLUTION INTRAMUSCULAR; INTRAVENOUS; SUBCUTANEOUS
Status: DISCONTINUED | OUTPATIENT
Start: 2022-03-24 | End: 2022-03-24 | Stop reason: HOSPADM

## 2022-03-24 RX ADMIN — LIDOCAINE HYDROCHLORIDE 1 MG: 10 INJECTION, SOLUTION EPIDURAL; INFILTRATION; INTRACAUDAL; PERINEURAL at 01:03

## 2022-03-24 RX ADMIN — SODIUM CHLORIDE, SODIUM LACTATE, POTASSIUM CHLORIDE, AND CALCIUM CHLORIDE: .6; .31; .03; .02 INJECTION, SOLUTION INTRAVENOUS at 01:03

## 2022-03-24 NOTE — OP NOTE
PROCEDURE DATE: 3/24/2022    Procedure: C7-T1 cervical interlaminar epidural steroid injection under utilizing fluoroscopy.    Diagnosis: Cervical Radiculopathy    POSTOP DIAGNOSIS: SAME    Physician: Nabil Olvera MD    Medications injected:  Methylprednisone 80mg followed by a slow injection of 4 mL sterile, preservative-free normal saline.    Local anesthetic used: Lidocaine 1%, 4 ml.    Sedation Medications: 2mg versed    Complications:  none    Estimated blood loss: none    Technique:  A time-out was taken to identify patient and procedure prior to starting the procedure.  With the patient laying in a prone position with the neck in a mid-flexed forward position, the area was prepped and draped in the usual sterile fashion using ChloraPrep and a fenestrated drape.  The area was determined under AP fluoroscopic guidance.  Local anesthetic was given using a 25-gauge 1.5 inch needle by raising a wheal and then infiltrating ventrally.  A 3.5 inch 20-gauge Touhy needle was introduced under fluoroscopic guidance to meet the lamina of C7.  The needle was then hinged under the lamina then advanced using loss of resistance technique.  Once the tip of the needle was in the desired position, the contrast dye Omnipaque was injected to determine placement and no uptake.  The steroid was then injected slowly followed by a slow injection of 4 mL of the sterile preservative-free normal saline.  The patient tolerated the procedure well.    The patient was monitored after the procedure and was given post-procedure and discharge instructions to follow at home. The patient was discharged in a stable condition.

## 2022-03-24 NOTE — INTERVAL H&P NOTE
The patient has been examined and the H&P has been reviewed:    I concur with the findings and no changes have occurred since H&P was written.    Procedure risks, benefits and alternative options discussed and understood by patient/family.    ASA 2, mallampati 2          There are no hospital problems to display for this patient.

## 2022-03-24 NOTE — DISCHARGE SUMMARY
[place] - Surgery  Discharge Note  Short Stay    Procedure(s) (LRB):  Injection-steroid-epidural-cervical spread to Left side (N/A)    OUTCOME: Patient tolerated treatment/procedure well without complication and is now ready for discharge.    DISPOSITION: Home or Self Care    FINAL DIAGNOSIS:  Cervical radiculopathy    FOLLOWUP: In clinic    DISCHARGE INSTRUCTIONS:    Discharge Procedure Orders   Diet Adult Regular     No dressing needed     Notify your health care provider if you experience any of the following:  temperature >100.4     Activity as tolerated

## 2022-03-25 VITALS
HEART RATE: 80 BPM | HEIGHT: 60 IN | OXYGEN SATURATION: 100 % | TEMPERATURE: 98 F | BODY MASS INDEX: 44.37 KG/M2 | DIASTOLIC BLOOD PRESSURE: 85 MMHG | RESPIRATION RATE: 16 BRPM | WEIGHT: 226 LBS | SYSTOLIC BLOOD PRESSURE: 142 MMHG

## 2022-04-07 ENCOUNTER — OFFICE VISIT (OUTPATIENT)
Dept: PAIN MEDICINE | Facility: CLINIC | Age: 57
End: 2022-04-07
Payer: OTHER GOVERNMENT

## 2022-04-07 VITALS
BODY MASS INDEX: 44.66 KG/M2 | HEART RATE: 78 BPM | HEIGHT: 60 IN | SYSTOLIC BLOOD PRESSURE: 141 MMHG | DIASTOLIC BLOOD PRESSURE: 88 MMHG | WEIGHT: 227.5 LBS

## 2022-04-07 DIAGNOSIS — M54.12 CERVICAL RADICULOPATHY: Primary | ICD-10-CM

## 2022-04-07 DIAGNOSIS — G89.29 CHRONIC LEFT SHOULDER PAIN: ICD-10-CM

## 2022-04-07 DIAGNOSIS — M25.512 CHRONIC LEFT SHOULDER PAIN: ICD-10-CM

## 2022-04-07 PROCEDURE — 99214 OFFICE O/P EST MOD 30 MIN: CPT | Mod: S$PBB,,, | Performed by: ANESTHESIOLOGY

## 2022-04-07 PROCEDURE — 99214 PR OFFICE/OUTPT VISIT, EST, LEVL IV, 30-39 MIN: ICD-10-PCS | Mod: S$PBB,,, | Performed by: ANESTHESIOLOGY

## 2022-04-07 PROCEDURE — 99214 OFFICE O/P EST MOD 30 MIN: CPT | Mod: PBBFAC,PN | Performed by: ANESTHESIOLOGY

## 2022-04-07 PROCEDURE — 99999 PR PBB SHADOW E&M-EST. PATIENT-LVL IV: ICD-10-PCS | Mod: PBBFAC,,, | Performed by: ANESTHESIOLOGY

## 2022-04-07 PROCEDURE — 99999 PR PBB SHADOW E&M-EST. PATIENT-LVL IV: CPT | Mod: PBBFAC,,, | Performed by: ANESTHESIOLOGY

## 2022-04-07 NOTE — PROGRESS NOTES
This note was completed with dictation software and grammatical errors may exist.    Chief Complaint   Patient presents with    Follow-up    Back Pain        HPI: Norma Call is a 57 y.o. year old female patient who has a past medical history of a Family H/O CAD, b Chronic Hypokalemia, b Hypertension, c Hypercholesteremia, d Type 2 Diabetes Mellitus, e Family H/O Thyroid Disorder, f Borderline Osteopenia, f Obesity, g Chronic Mild Thrombocytosis, i H/O COVID-19 Infection, i Obstructive Sleep Apnea, j Family H/O Colon Cancer, j H/O Hyperplastic Colon Polyp On 4/30/15 TC, j Sigmoid Diverticulosis, k H/O Abnormal PAP Smear, k Hemorrhagic Right Renal Cyst, l H/O Left Ankle And Foot Fracture Repair SX 09/2015, m Headaches, o Allergic Rhinosinusitis, q Acne Vulgaris, q Vitamin D Deficiency, and Wellness Visit 6/17/2021. She presents in referral from Sofia Poole for neck pain.  The patient was evaluated by zach Ramirez back and spine and found to have a likely left C5 radiculopathy.  She was sent for an epidural steroid injection.She is status post cervical UGO on 03/24/2022 with 90% relief.  She states that previously she had been having left shoulder pain, left arm pain for the last year.  She was having pain in the left shoulder, anterior shoulder, bicep, forearm and into the entire hand.  She describes the pain as burning, throbbing, tight, deep.  Is worse with extending, lifting or getting out of a bed or a chair.  She reports relief with rest, sitting down, medications, injections and physical therapy.  She states that she was dropping things, had frequent pain.  Now she no longer has any pain throughout the arm or neck except for the anterior shoulder and biceps.  She states that she previously could not raise her arm above her head or reach behind her and that has resolved.  Overall she is very pleased with the results, does not need any further treatment.    Pain intervention history:  She is status  post cervical UGO on 03/24/2022 with 90% relief.    Spine surgeries:  None    Antineuropathics:  Gabapentin 800 mg twice daily  NSAIDs:  Ibuprofen 800  Physical therapy:  Has been doing physical therapy with some relief  Antidepressants:  Muscle relaxers:  Opioids:  Antiplatelets/Anticoagulants:  Aspirin 81        ROS:  She reports weight gain, headaches, ear pain, swollen glands, shortness of breath, appetite change, constipation, joint stiffness.  Balance of review of systems is negative.    Lab Results   Component Value Date    HGBA1C 7.1 (H) 12/16/2021       Lab Results   Component Value Date    WBC 7.78 12/17/2021    HGB 12.4 12/17/2021    HCT 38.8 12/17/2021    MCV 86 12/17/2021     12/17/2021             Past Medical History:   Diagnosis Date    a Family H/O CAD ###    On ASA 81 Mg Daily; 7/2/21 Cardiac CT Ca+ Score = 0 (See Report); 3/20/13 LCST (Dr. CLARA Treviño) = Entirely Normal; 3/20/13 Echo (Dr. CLARA Treviño) = Normal With EF 60-65%, With Normal Biventricular Size And Function, And With No Diastolic Dysfuncion     b Chronic Hypokalemia     3/30/17 Increased Her 10 mEq KCl Daily To Bid; Is Likely Due To Her Losartan/HCTZ 100/12.5 Mg qAM    b Hypertension     12/6/19 Added Norvasc 5 Mg qHS; 3/30/17 Increased Losartan/HCTZ 100/12.5 To 100/25 Mg qAM; 12/6/19 RXd A Low Salt Diet    c Hypercholesteremia     6/15/20 RXd Crestor 10 Mg qHS And D/Cd Pravastatin (Was Ineffective); 1/19/20 Increased 10 Mg Pravastatin To 40 Mg qHS    d Type 2 Diabetes Mellitus     12/21/21 Increased Amaryl To 2 Mg qAM; 7/4/20 Added Amaryl 1 Mg qAM; On Metformin- Mg Daily    e Family H/O Thyroid Disorder     f Borderline Osteopenia     5/5/18 Continued Her OTC D3 50K IU Weekly, And RXd OTC CaCO3 1,200 Mg Daily    f Obesity     10/2/17 RXd Lifestyle Changes; 5/10/16 BMI = 39.26    g Chronic Mild Thrombocytosis     Am Monitoring    i H/O COVID-19 Infection     In 01/2021    i Obstructive Sleep Apnea ###    Dr. Velazquez  "Ranjan On 8/1/13 Scheduled A CPAP Titration Study    j Family H/O Colon Cancer ###    Dr. William Ruiz    j H/O Hyperplastic Colon Polyp On 4/30/15 TC     Dr. William Ruiz: "Repeat TC In 5 YRs"    j Sigmoid Diverticulosis     Dr. William Ruiz    k H/O Abnormal PAP Smear     Dr. Phillip Jj; Repeats Have Been Normal    k Hemorrhagic Right Renal Cyst     Dr. Verna Alicea On 11/15/16 Does NOT Suspect Renal Cell CA; 03/2016 Renal U/S And 05/2016 MRI Were Benign    l H/O Left Ankle And Foot Fracture Repair SX 09/2015     Dr. Page In [place]; She Has Been Having Heel Pain Since He Removed The Screws    m Headaches     Takes Ibuprofen 800 Mg PRN And Tramadol 50 Mg qHS PRN    o Allergic Rhinosinusitis     q Acne Vulgaris     q Vitamin D Deficiency     On Vitamin D3 50K Weekly     Wellness Visit 6/17/2021        Past Surgical History:   Procedure Laterality Date    ANKLE FRACTURE SURGERY      torn ligaments left foot    EPIDURAL STEROID INJECTION INTO CERVICAL SPINE N/A 3/24/2022    Procedure: Injection-steroid-epidural-cervical spread to Left side;  Surgeon: Nabil Olvera MD;  Location: [facility] OR;  Service: Pain Management;  Laterality: N/A;    FOOT SURGERY      screws removed from left foot    HYSTERECTOMY      INSERTION OF IMPLANTABLE LOOP RECORDER N/A 9/14/2021    Procedure: Insertion, Implantable Loop Recorder;  Surgeon: Meagan Treviño MD;  Location: [facility];  Service: Cardiology;  Laterality: N/A;    tubaligation         Social History     Socioeconomic History    Marital status:      Spouse name: Ulysses    Number of children: 3   Occupational History    Occupation: nurse   Tobacco Use    Smoking status: Never Smoker    Smokeless tobacco: Never Used   Substance and Sexual Activity    Alcohol use: No    Drug use: No    Sexual activity: Not Currently         Medications/Allergies: See med card    Vitals:    04/07/22 1007   BP: (!) 141/88   Pulse: 78 "   Weight: 103.2 kg (227 lb 8.2 oz)   Height: 5' (1.524 m)   PainSc:   1   PainLoc: Back     Body mass index is 44.43 kg/m².    Physical exam:  Gen: A and O x3, pleasant, well-groomed  Skin: No rashes or obvious lesions  HEENT: PERRLA, no obvious deformities on ears or in canals.Trachea midline.  CVS: Regular rate and rhythm, normal palpable pulses.  Resp: Clear to auscultation bilaterally, no wheezes or rales.  Abdomen: Soft, NT/ND.  Musculoskeletal: Able to heel walk, toe walk. No antalgic gait.     Neuro:  Motor:    Right Left   C4 Shoulder Abduction  5  5   C5 Elbow Flexion    5  5   C6 Wrist Extension  5  5   C7 Elbow Extension   5  5   C8/T1 Hand Intrinsics   5  5   C8 First Dorsal Interosseus  5  5   C8 Abductor Pollicus Brevis  5  5      Left  Right    Triceps DTR 2+ 2+   Biceps DTR 2+ 2+   Brachioradialis DTR 2+ 2+   Patellar DTR 2+ 2+   Achilles DTR 2+ 2+   Eaton Absent  Absent   Clonus Absent Absent     Babinski Absent Absent     Sensory: Intact and symmetrical to light touch and pinprick in C2-T1 dermatomes bilaterally.  Cervical spine: ROM is full in flexion, extension and lateral rotation without increased pain.  Spurling's maneuver causes no neck pain to either side.  Myofascial exam: No Tenderness to palpation across cervical paraspinous region bilaterally.  Left shoulder passive range of motion normal without pain, negative empty can test, negative for pain on internal external rotation.    Imaging:  3/10/22 MRI C-spine:  Vertebral column: As seen on comparison plain films, there is straightening of the cervical spine with loss of normal lordosis.  This is likely positional.  The vertebral bodies maintain normal height and alignment.  There is mild disc space narrowing at the C4-5 through C6-7 levels where there is also mild endplate osteophyte formation.  The odontoid process is intact.  There is minimal flattening of the superior endplate of T2, T3 and T4.  Marrow signal intensity is normal in  these vertebral bodies suggesting that these are chronic processes.  Spinal canal, cord, epidural space: The spinal canal may be borderline small on a developmental basis.  There is no abnormal epidural mass or fluid collection.  The cord is normal in signal intensity.  Patient motion on the axial images does somewhat limit evaluation.  There is mild flattening of the ventral cord surface at the C4-5 level where there is a shallow disc protrusion.  This is best appreciated on the sagittal images.   Findings by level: As mentioned above, the axial images are moderate to markedly motion degraded.  This does limit evaluation of spinal canal and foraminal stenosis as well as cord signal.   C2-3: There is no spinal canal or significant foraminal stenosis.  There is only mild right uncovertebral spurring.   C3-4: There is mild right uncovertebral spurring with a mild disc osteophyte complex.  There is mild spinal stenosis with moderate right foraminal stenosis suspected.   C4-5: There is mild disc space narrowing, left greater than right uncovertebral spurring.  There is a broad central disc protrusion which does subtly flatten the ventral cord surface resulting in mild spinal stenosis.  There may also be mild-to-moderate left foraminal stenosis.   C5-6: There is mild disc space narrowing.  There is a shallow broad disc protrusion.  There is borderline spinal stenosis.  There may be mild left foraminal narrowing with there is suspected facet joint arthropathy and uncovertebral spurring.   C6-7: There is no spinal canal or significant foraminal stenosis.   C7-T1: There is no spinal canal or significant foraminal stenosis.    Assessment:  Norma Call is a 57 y.o. year old female patient who has a past medical history of a Family H/O CAD, b Chronic Hypokalemia, b Hypertension, c Hypercholesteremia, d Type 2 Diabetes Mellitus, e Family H/O Thyroid Disorder, f Borderline Osteopenia, f Obesity, g Chronic Mild Thrombocytosis,  i H/O COVID-19 Infection, i Obstructive Sleep Apnea, j Family H/O Colon Cancer, j H/O Hyperplastic Colon Polyp On 4/30/15 TC, j Sigmoid Diverticulosis, k H/O Abnormal PAP Smear, k Hemorrhagic Right Renal Cyst, l H/O Left Ankle And Foot Fracture Repair SX 09/2015, m Headaches, o Allergic Rhinosinusitis, q Acne Vulgaris, q Vitamin D Deficiency, and Wellness Visit 6/17/2021. She presents in referral from Sofia Poole for neck pain.  1. Cervical radiculopathy     2. Chronic left shoulder pain         Plan:  1.  We discussed that she had relief with the epidural steroid injection, reviewed her cervical spine MRI.  We discussed that some of her history sounds like shoulder pain but on exam she does not have any exacerbation.  At this point she is doing well, if the pain should worsen in the future it is more related the shoulder we will have her see orthopedics, otherwise we can always repeat the injection as needed.  We discussed that if she should have any weakness, I would have her see 1 of our neurosurgeons.    Thank you for referring this interesting patient, and I look forward to continuing to collaborate in her care.

## 2022-07-19 PROBLEM — K43.9 VENTRAL HERNIA WITHOUT OBSTRUCTION OR GANGRENE: Status: ACTIVE | Noted: 2022-07-19

## 2022-07-25 PROBLEM — Z79.01 CHRONIC ANTICOAGULATION: Status: ACTIVE | Noted: 2022-07-25

## 2022-07-25 PROBLEM — I48.0 PAROXYSMAL ATRIAL FIBRILLATION: Status: ACTIVE | Noted: 2022-07-25

## 2022-07-25 PROBLEM — Z87.19 HISTORY OF ABDOMINAL HERNIA: Status: ACTIVE | Noted: 2022-07-25

## 2022-07-25 PROBLEM — Z79.01 CHRONIC ANTICOAGULATION: Status: RESOLVED | Noted: 2022-07-25 | Resolved: 2022-07-25

## 2022-07-25 PROBLEM — J45.40 MODERATE PERSISTENT ASTHMA WITHOUT COMPLICATION: Status: ACTIVE | Noted: 2022-07-25

## 2023-01-15 ENCOUNTER — HOSPITAL ENCOUNTER (EMERGENCY)
Facility: HOSPITAL | Age: 58
Discharge: HOME | End: 2023-01-16
Attending: EMERGENCY MEDICINE
Payer: OTHER GOVERNMENT

## 2023-01-15 DIAGNOSIS — Z76.89 ENCOUNTER FOR INCISION AND DRAINAGE PROCEDURE: ICD-10-CM

## 2023-01-15 DIAGNOSIS — L02.11 ABSCESS, NECK: Primary | ICD-10-CM

## 2023-01-15 PROCEDURE — 25000003 PHARM REV CODE 250: Performed by: NURSE PRACTITIONER

## 2023-01-15 PROCEDURE — 99284 EMERGENCY DEPT VISIT MOD MDM: CPT | Mod: 25

## 2023-01-15 PROCEDURE — 10060 I&D ABSCESS SIMPLE/SINGLE: CPT

## 2023-01-15 RX ORDER — SULFAMETHOXAZOLE AND TRIMETHOPRIM 800; 160 MG/1; MG/1
2 TABLET ORAL
Status: COMPLETED | OUTPATIENT
Start: 2023-01-15 | End: 2023-01-15

## 2023-01-15 RX ORDER — DIAZEPAM 5 MG/1
10 TABLET ORAL
Status: COMPLETED | OUTPATIENT
Start: 2023-01-15 | End: 2023-01-15

## 2023-01-15 RX ORDER — LIDOCAINE HYDROCHLORIDE 20 MG/ML
10 INJECTION, SOLUTION INFILTRATION; PERINEURAL
Status: COMPLETED | OUTPATIENT
Start: 2023-01-15 | End: 2023-01-15

## 2023-01-15 RX ADMIN — DIAZEPAM 10 MG: 5 TABLET ORAL at 11:01

## 2023-01-15 RX ADMIN — LIDOCAINE HYDROCHLORIDE 10 ML: 20 INJECTION, SOLUTION INFILTRATION; PERINEURAL at 11:01

## 2023-01-15 RX ADMIN — SULFAMETHOXAZOLE AND TRIMETHOPRIM 2 TABLET: 800; 160 TABLET ORAL at 11:01

## 2023-01-15 NOTE — Clinical Note
"Norma Lopeza"Jakub was seen and treated in our emergency department on 1/15/2023.  She may return to work on 01/17/2023.       If you have any questions or concerns, please don't hesitate to call.      Jaswinder ESCALANTE    "

## 2023-01-16 VITALS
SYSTOLIC BLOOD PRESSURE: 125 MMHG | RESPIRATION RATE: 18 BRPM | DIASTOLIC BLOOD PRESSURE: 59 MMHG | HEART RATE: 75 BPM | BODY MASS INDEX: 42.8 KG/M2 | TEMPERATURE: 99 F | OXYGEN SATURATION: 99 % | WEIGHT: 219.13 LBS

## 2023-01-16 PROCEDURE — 10060 I&D ABSCESS SIMPLE/SINGLE: CPT

## 2023-01-16 RX ORDER — TRAMADOL HYDROCHLORIDE 50 MG/1
50 TABLET ORAL EVERY 6 HOURS PRN
Qty: 12 TABLET | Refills: 0 | Status: SHIPPED | OUTPATIENT
Start: 2023-01-16 | End: 2023-01-26

## 2023-01-16 RX ORDER — SULFAMETHOXAZOLE AND TRIMETHOPRIM 800; 160 MG/1; MG/1
1 TABLET ORAL 2 TIMES DAILY
Qty: 14 TABLET | Refills: 0 | Status: SHIPPED | OUTPATIENT
Start: 2023-01-16 | End: 2023-01-23

## 2023-01-16 NOTE — ED PROVIDER NOTES
HISTORY     Chief Complaint   Patient presents with    Abscess     Abscess to right neck; pt states that she feels like another is forming above right ear     Review of patient's allergies indicates:   Allergen Reactions    Percocet [oxycodone-acetaminophen] Nausea And Vomiting    Codeine Palpitations        HPI   The history is provided by the patient.   Abscess   This is a new problem. The current episode started several days ago. The problem occurs rarely. The problem has been gradually worsening. The abscess is present on the neck. The pain is at a severity of 6/10. The abscess is characterized by redness, painfulness and swelling. Pertinent negatives include no anorexia, no decrease in physical activity, not sleeping less, not drinking less, no fever and no sore throat. There were no sick contacts. She has received no recent medical care.    Patient putting warm compresses and antibiotic ointment.     PCP: Lazaro Jensen MD     Past Medical History:  Past Medical History:   Diagnosis Date    a Chronic Anticoagulation With Eliquis     Dr. Meagan Treviño    a Family H/O CAD     Dr. Meagan Treviño; On ASA 81 Mg Daily; 7/2/21 Cardiac CT Ca+ Score = 0 (See Report); 3/20/13 LCST (Dr. CLARA Treviño) = Entirely Normal; 3/20/13 Echo (Dr. CLARA Treviño) = Normal With EF 60-65%, With Normal Biventricular Size And Function, And With No Diastolic Dysfuncion    a Paroxysmal Atrial Fibrillation With H/O RVR     Dr. Meagan Treviño    b Chronic Hypokalemia     3/30/17 Increased Her 10 mEq KCl Daily To Bid; Is Likely Due To Her Losartan/HCTZ 100/12.5 Mg qAM    b Hypertension     12/6/19 Added Norvasc 5 Mg qHS; 3/30/17 Increased Losartan/HCTZ 100/12.5 To 100/25 Mg qAM; 12/6/19 RXd A Low Salt Diet    c Hypercholesteremia     6/15/20 RXd Crestor 10 Mg qHS And D/Cd Pravastatin (Was Ineffective); 1/19/20 Increased 10 Mg Pravastatin To 40 Mg qHS    d Type 2 Diabetes Mellitus     12/21/21 Increased Amaryl To 2 Mg qAM; 7/4/20 Added Amaryl 1 Mg  "qAM; On Metformin- Mg Daily    e Family H/O Thyroid Disorder     f Borderline Osteopenia     5/5/18 Continued Her OTC D3 50K IU Weekly, And RXd OTC CaCO3 1,200 Mg Daily    f Obesity     10/2/17 RXd Lifestyle Changes; 5/10/16 BMI = 39.26    g Chronic Mild Thrombocytosis     Am Monitoring    i H/O COVID-19 Infection     In 01/2021    i Moderate Persistent Asthma     Marina Adan (Pulmonology FNP)    i Obstructive Sleep Apnea     Dr. Caroline Woodruff; She Cannot Tolerate CPAP Or BiPAP Masks; 7/25/22 Gave [facility]' Information    j Family H/O Colon Cancer ###    Dr. William lew H/O Hyperplastic Colon Polyp On 4/30/15 TC     Dr. William Ruiz: "Repeat TC In 5 YRs"    j H/O Ventral Abdominal Hernia Repair In 07/2022     Dr. Jeff lew Sigmoid Diverticulosis     Dr. William Ruiz    k H/O Abnormal PAP Smear     Dr. Phillip Jj; Repeats Have Been Normal    k Hemorrhagic Right Renal Cyst     Dr. Verna Alicea On 11/15/16 Does NOT Suspect Renal Cell CA; 03/2016 Renal U/S And 05/2016 MRI Were Benign    l H/O Left Ankle And Foot Fracture Repair SX 09/2015     Dr. Page In [place]; She Has Been Having Heel Pain Since He Removed The Screws    m Headaches     Takes Ibuprofen 800 Mg PRN And Tramadol 50 Mg qHS PRN    o Allergic Rhinosinusitis     q Acne Vulgaris     q Vitamin D Deficiency     On Vitamin D3 50K Weekly     Wellness Visit 7/25/2022         Past Surgical History:  Past Surgical History:   Procedure Laterality Date    ANKLE FRACTURE SURGERY      torn ligaments left foot    COLONOSCOPY N/A 7/7/2022    Procedure: COLONOSCOPY;  Surgeon: William Ruiz MD;  Location: [facility];  Service: Endoscopy;  Laterality: N/A;    EPIDURAL STEROID INJECTION INTO CERVICAL SPINE N/A 3/24/2022    Procedure: Injection-steroid-epidural-cervical spread to Left side;  Surgeon: Nabil Olvera MD;  Location: [facility] OR;  Service: Pain Management;  Laterality: N/A;    FOOT SURGERY      " screws removed from left foot    HYSTERECTOMY      INSERTION OF IMPLANTABLE LOOP RECORDER N/A 9/14/2021    Procedure: Insertion, Implantable Loop Recorder;  Surgeon: Meagan Treviño MD;  Location: [facility] CATH;  Service: Cardiology;  Laterality: N/A;    LAPAROSCOPIC REPAIR OF VENTRAL HERNIA N/A 7/19/2022    Procedure: REPAIR, HERNIA, VENTRAL, LAPAROSCOPIC;  Surgeon: Jeff Alonzo MD;  Location: [facility] OR;  Service: General;  Laterality: N/A;    tubaligation          Family History:  Family History   Problem Relation Age of Onset    Diabetes Maternal Grandmother     Stroke Father     Colon cancer Brother     Diabetes Brother     Hypertension Brother     Cancer Brother     Heart failure Mother     Liver disease Daughter         Social History:  Social History     Tobacco Use    Smoking status: Never    Smokeless tobacco: Never   Substance and Sexual Activity    Alcohol use: No    Drug use: No    Sexual activity: Not Currently         ROS   Review of Systems   Constitutional:  Negative for fever.   HENT:  Negative for sore throat.    Respiratory:  Negative for shortness of breath.    Cardiovascular:  Negative for chest pain.   Gastrointestinal:  Negative for anorexia and nausea.   Genitourinary:  Negative for dysuria.   Musculoskeletal:  Negative for back pain.   Skin:  Negative for rash.        Abscess right neck   Neurological:  Negative for weakness.   Hematological:  Does not bruise/bleed easily.     PHYSICAL EXAM     Initial Vitals [01/15/23 2245]   BP Pulse Resp Temp SpO2   (!) 141/82 81 18 98.8 °F (37.1 °C) 100 %      MAP       --           Physical Exam    Constitutional: She appears well-developed and well-nourished. No distress.   HENT:   Head: Normocephalic and atraumatic.   Eyes: Conjunctivae are normal. Pupils are equal, round, and reactive to light.   Neck: Neck supple.       Normal range of motion.  Cardiovascular:  Normal rate, regular rhythm and normal heart sounds.           Pulmonary/Chest: Breath  sounds normal.   Abdominal: Abdomen is soft. Bowel sounds are normal. She exhibits no distension. There is no abdominal tenderness. There is no rebound.   Musculoskeletal:         General: No edema. Normal range of motion.      Cervical back: Normal range of motion and neck supple.     Neurological: She is alert and oriented to person, place, and time. She has normal strength.   Skin: Skin is warm and dry.   Psychiatric: She has a normal mood and affect.        ED COURSE   I & D - Incision and Drainage    Date/Time: 1/16/2023 12:12 PM  Location procedure was performed: [facility] EMERGENCY DEPARTMENT  Performed by: Brendon Moore NP  Authorized by: Efrain Mays MD   Type: cyst  Body area: head/neck  Location details: neck  Anesthesia: local infiltration    Anesthesia:  Local Anesthetic: lidocaine 1% without epinephrine  Risk factor: coagulopathy  Scalpel size: 11  Complexity: simple  Drainage: pus and bloody  Drainage amount: moderate  Wound treatment: incision, drainage, deloculation and expression of material  Complications: No  Patient tolerance: Patient tolerated the procedure well with no immediate complications      ED ONGOING VITALS:  Vitals:    01/15/23 2245 01/15/23 2354 01/16/23 0003   BP: (!) 141/82 101/60 (!) 125/59   Pulse: 81 77 75   Resp: 18     Temp: 98.8 °F (37.1 °C)     SpO2: 100% 99% 99%   Weight: 99.4 kg (219 lb 2.2 oz)           ABNORMAL LAB VALUES:  Labs Reviewed - No data to display      ALL LAB VALUES:        RADIOLOGY STUDIES:  Imaging Results    None                   The above vital signs and test results have been reviewed by the emergency provider.     ED Medications:  Discharge Medication List as of 1/16/2023 12:03 AM        START taking these medications    Details   sulfamethoxazole-trimethoprim 800-160mg (BACTRIM DS) 800-160 mg Tab Take 1 tablet by mouth 2 (two) times daily. for 7 days, Starting Mon 1/16/2023, Until Mon 1/23/2023, Print      traMADoL (ULTRAM) 50 mg tablet Take 1  tablet (50 mg total) by mouth every 6 (six) hours as needed for Pain., Starting Mon 1/16/2023, Until Thu 1/26/2023 at 2359, Print           Discharge Medications:  Discharge Medication List as of 1/16/2023 12:03 AM        START taking these medications    Details   sulfamethoxazole-trimethoprim 800-160mg (BACTRIM DS) 800-160 mg Tab Take 1 tablet by mouth 2 (two) times daily. for 7 days, Starting Mon 1/16/2023, Until Mon 1/23/2023, Print      traMADoL (ULTRAM) 50 mg tablet Take 1 tablet (50 mg total) by mouth every 6 (six) hours as needed for Pain., Starting Mon 1/16/2023, Until Thu 1/26/2023 at 2359, Print               I discussed with patient and/or family/caretaker that evaluation in the ED does not suggest any emergent or life threatening medical conditions requiring immediate intervention beyond what was provided in the ED, and I believe patient is safe for discharge. Regardless, an unremarkable evaluation in the ED does not preclude the development or presence of a serious or life threatening condition. As such, patient was instructed to return immediately for any worsening or change in current symptoms.    For  CELLULITIS/ABSCESS, I advised patient to: keep area clean and dry; apply antibiotic ointment as prescribed; refrain from squeezing or irritating site; apply moist heat to help with pain and abscess drainage; and to take antibiotics as prescribed. Patient was instructed to follow up with primary care provider, urgent care facility, or the emergency room if symptoms worsen and they develop a fever greater than 102.5 °F, have pain unrelieved by OTC or prescription medications, and excessive swelling. Also instructed patient to follow up with provider in 24-48 hours for wound re-check and possible packing change.          MEDICAL DECISION MAKING                 CLINICAL IMPRESSION       ICD-10-CM ICD-9-CM   1. Abscess, neck  L02.11 682.1   2. Encounter for incision and drainage procedure  Z76.89 V72.85        Disposition:   Disposition: Discharged  Condition: Stable       Brendon Moore NP  01/16/23 0123

## 2023-08-11 PROBLEM — Z87.19 HISTORY OF ABDOMINAL HERNIA: Status: RESOLVED | Noted: 2022-07-25 | Resolved: 2023-08-11

## 2023-08-11 PROBLEM — Z86.010 HISTORY OF COLON POLYPS: Status: ACTIVE | Noted: 2023-08-11

## 2023-08-11 PROBLEM — Z86.0100 HISTORY OF COLON POLYPS: Status: ACTIVE | Noted: 2023-08-11

## 2023-08-11 PROBLEM — Z87.19 HISTORY OF ABDOMINAL HERNIA: Status: ACTIVE | Noted: 2023-08-11

## 2023-08-27 PROBLEM — M85.80 OSTEOPENIA: Status: ACTIVE | Noted: 2023-08-27

## 2024-10-17 ENCOUNTER — OFFICE VISIT (OUTPATIENT)
Dept: OTOLARYNGOLOGY | Facility: CLINIC | Age: 59
End: 2024-10-17
Payer: OTHER GOVERNMENT

## 2024-10-17 VITALS — HEIGHT: 60 IN | BODY MASS INDEX: 41.34 KG/M2 | WEIGHT: 210.56 LBS

## 2024-10-17 DIAGNOSIS — H91.93 SUBJECTIVE HEARING CHANGE OF BOTH EARS: ICD-10-CM

## 2024-10-17 DIAGNOSIS — H69.93 ETD (EUSTACHIAN TUBE DYSFUNCTION), BILATERAL: Primary | ICD-10-CM

## 2024-10-17 DIAGNOSIS — H61.23 BILATERAL IMPACTED CERUMEN: ICD-10-CM

## 2024-10-17 PROCEDURE — 69210 REMOVE IMPACTED EAR WAX UNI: CPT | Mod: PBBFAC | Performed by: PHYSICIAN ASSISTANT

## 2024-10-17 PROCEDURE — 99999 PR PBB SHADOW E&M-EST. PATIENT-LVL III: CPT | Mod: PBBFAC,,, | Performed by: PHYSICIAN ASSISTANT

## 2024-10-17 PROCEDURE — 99213 OFFICE O/P EST LOW 20 MIN: CPT | Mod: PBBFAC | Performed by: PHYSICIAN ASSISTANT

## 2024-10-17 NOTE — PROGRESS NOTES
Subjective     Patient ID: Norma Call is a 59 y.o. female.    Chief Complaint: Otalgia (Bilateral ear pain ongoing since July when she flew to [place]. She states her R ear hurts more. She also c/o sinus symptoms and hoarseness that started this morning.)    Patient is a pleasant 59 year old female here to see me today for the first time for evaluation of ear pain and fullness AU on and off since flight from [place] in July 2024.  Had pressure in both ears during the flight and then felt like the right ear took longer to open compared to the left ear once she was home.  She used wax drops in the right ear and states that seemed to help but now both ears are bothersome again.  Right ear over the past month has felt full with muffled hearing; no tinnitus.  The left ear hurts this AM.  She takes Singulair and Xyzal daily and uses Flonase PRN.  Denies dizziness.  Denies previous otologic surgery.       Review of Systems   Constitutional:  Negative for fever.   HENT:  Positive for hearing loss and rhinorrhea. Negative for ear discharge, ear pain (has pressure AU), postnasal drip and tinnitus.    Neurological:  Negative for dizziness.          Objective     Physical Exam  Constitutional:       Appearance: Normal appearance.   HENT:      Head: Normocephalic and atraumatic.      Jaw: No trismus.      Right Ear: Ear canal and external ear normal. No drainage. There is impacted cerumen (removal described below). Tympanic membrane is not erythematous.      Left Ear: Ear canal and external ear normal. No drainage. There is impacted cerumen. Tympanic membrane is not erythematous.      Nose: Congestion present. No rhinorrhea.      Right Turbinates: Enlarged.      Left Turbinates: Enlarged.      Right Sinus: No maxillary sinus tenderness or frontal sinus tenderness.      Left Sinus: No maxillary sinus tenderness or frontal sinus tenderness.      Mouth/Throat:      Mouth: Mucous membranes are moist.      Pharynx: Oropharynx is  clear.   Eyes:      Pupils: Pupils are equal, round, and reactive to light.   Pulmonary:      Effort: Pulmonary effort is normal.   Neurological:      General: No focal deficit present.      Mental Status: She is alert.   Psychiatric:         Behavior: Behavior is cooperative.         Procedure Note    CHIEF COMPLAINT:  Cerumen Impaction    Description:  The patient was seated in an exam chair.  An ear speculum was placed in the right EAC and was examined under the microscope.  Alligator forceps and/or loop curettes were used to remove a large cerumen impaction.  The tympanic membrane was visualized and was normal in appearance.  The procedure was repeated on the left side in a similar fashion.  The TM was intact and normal on this side as well.  The patient tolerated the procedure well.       Assessment and Plan     1. ETD (Eustachian tube dysfunction), bilateral    2. Subjective hearing change of both ears    3. Bilateral impacted cerumen        We had a long discussion regarding the anatomy and function of the eustachian tube.  We discussed that the eustachian tube acts as a pump to keep the appropriate amount of pressure behind the ear drum.  I recommend consistent use of her nasal steroid spray to be used on a daily basis, and we discussed that it will take 2-3 weeks of daily use to achieve maximal effectiveness.    Recommend scheduling an audiogram for further evaluation and I'll review those results once completed.       Cerumen impaction:  Removed today without difficulty.  I would recommend the use of a wax softening drop, either over the counter Debrox or mineral oil, on a weekly basis.  I also instructed the patient to avoid Qtips.         No follow-ups on file.

## 2024-10-31 ENCOUNTER — CLINICAL SUPPORT (OUTPATIENT)
Dept: AUDIOLOGY | Facility: CLINIC | Age: 59
End: 2024-10-31
Payer: OTHER GOVERNMENT

## 2024-10-31 DIAGNOSIS — H91.93 SUBJECTIVE HEARING CHANGE OF BOTH EARS: ICD-10-CM

## 2024-10-31 DIAGNOSIS — H90.3 SENSORINEURAL HEARING LOSS, BILATERAL: Primary | ICD-10-CM

## 2024-10-31 DIAGNOSIS — H69.93 ETD (EUSTACHIAN TUBE DYSFUNCTION), BILATERAL: ICD-10-CM

## 2024-10-31 PROCEDURE — 92557 COMPREHENSIVE HEARING TEST: CPT | Mod: PBBFAC | Performed by: AUDIOLOGIST-HEARING AID FITTER

## 2024-10-31 PROCEDURE — 92567 TYMPANOMETRY: CPT | Mod: PBBFAC | Performed by: AUDIOLOGIST-HEARING AID FITTER

## 2024-10-31 PROCEDURE — 99999 PR PBB SHADOW E&M-EST. PATIENT-LVL II: CPT | Mod: PBBFAC,,, | Performed by: AUDIOLOGIST-HEARING AID FITTER

## 2024-10-31 PROCEDURE — 99212 OFFICE O/P EST SF 10 MIN: CPT | Mod: PBBFAC | Performed by: AUDIOLOGIST-HEARING AID FITTER

## 2025-05-11 ENCOUNTER — PATIENT MESSAGE (OUTPATIENT)
Dept: INTERNAL MEDICINE | Facility: CLINIC | Age: 60
End: 2025-05-11

## (undated) DEVICE — APPLICATOR CHLORAPREP CLR 10.5

## (undated) DEVICE — NDL TUOHY EPIDURAL 20G X 3.5

## (undated) DEVICE — SYR GLASS 5CC LUER LOK

## (undated) DEVICE — TRAY NERVE BLOCK

## (undated) DEVICE — MARKER SKIN STND TIP BLUE BARR

## (undated) DEVICE — GLOVE SURGICAL LATEX SZ 7